# Patient Record
Sex: FEMALE | Race: WHITE | HISPANIC OR LATINO | Employment: FULL TIME | URBAN - METROPOLITAN AREA
[De-identification: names, ages, dates, MRNs, and addresses within clinical notes are randomized per-mention and may not be internally consistent; named-entity substitution may affect disease eponyms.]

---

## 2018-07-06 ENCOUNTER — HOSPITAL ENCOUNTER (EMERGENCY)
Facility: HOSPITAL | Age: 23
Discharge: HOME | End: 2018-07-06
Attending: EMERGENCY MEDICINE | Admitting: EMERGENCY MEDICINE

## 2018-07-06 VITALS
RESPIRATION RATE: 16 BRPM | TEMPERATURE: 98.4 F | HEART RATE: 78 BPM | OXYGEN SATURATION: 100 % | DIASTOLIC BLOOD PRESSURE: 79 MMHG | SYSTOLIC BLOOD PRESSURE: 114 MMHG | WEIGHT: 149.03 LBS

## 2018-07-06 DIAGNOSIS — S01.512A INTRAORAL LACERATION, INITIAL ENCOUNTER: ICD-10-CM
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
## 2018-07-06 PROCEDURE — 99283 EMERGENCY DEPT VISIT LOW MDM: CPT
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
## 2019-06-10 ENCOUNTER — OFFICE VISIT (OUTPATIENT)
Dept: OBGYN CLINIC | Facility: CLINIC | Age: 24
End: 2019-06-10

## 2019-06-10 VITALS
HEART RATE: 80 BPM | DIASTOLIC BLOOD PRESSURE: 77 MMHG | HEIGHT: 62 IN | BODY MASS INDEX: 28.71 KG/M2 | WEIGHT: 156 LBS | SYSTOLIC BLOOD PRESSURE: 112 MMHG

## 2019-06-10 DIAGNOSIS — Z11.3 ROUTINE SCREENING FOR STI (SEXUALLY TRANSMITTED INFECTION): ICD-10-CM

## 2019-06-10 DIAGNOSIS — R10.2 PELVIC PAIN: ICD-10-CM

## 2019-06-10 DIAGNOSIS — R39.15 URINARY URGENCY: ICD-10-CM

## 2019-06-10 DIAGNOSIS — Z01.419 ENCOUNTER FOR ANNUAL ROUTINE GYNECOLOGICAL EXAMINATION: Primary | ICD-10-CM

## 2019-06-10 LAB
SL AMB  POCT GLUCOSE, UA: NEGATIVE
SL AMB LEUKOCYTE ESTERASE,UA: ABNORMAL
SL AMB POCT BILIRUBIN,UA: NEGATIVE
SL AMB POCT BLOOD,UA: ABNORMAL
SL AMB POCT CLARITY,UA: CLEAR
SL AMB POCT COLOR,UA: YELLOW
SL AMB POCT KETONES,UA: NEGATIVE
SL AMB POCT NITRITE,UA: NEGATIVE
SL AMB POCT PH,UA: 6
SL AMB POCT SPECIFIC GRAVITY,UA: 1.02
SL AMB POCT URINE PROTEIN: NEGATIVE
SL AMB POCT UROBILINOGEN: NEGATIVE

## 2019-06-10 PROCEDURE — 87086 URINE CULTURE/COLONY COUNT: CPT | Performed by: NURSE PRACTITIONER

## 2019-06-10 PROCEDURE — 87591 N.GONORRHOEAE DNA AMP PROB: CPT | Performed by: NURSE PRACTITIONER

## 2019-06-10 PROCEDURE — G0124 SCREEN C/V THIN LAYER BY MD: HCPCS | Performed by: PATHOLOGY

## 2019-06-10 PROCEDURE — G0145 SCR C/V CYTO,THINLAYER,RESCR: HCPCS | Performed by: PATHOLOGY

## 2019-06-10 PROCEDURE — 81002 URINALYSIS NONAUTO W/O SCOPE: CPT | Performed by: NURSE PRACTITIONER

## 2019-06-10 PROCEDURE — 87491 CHLMYD TRACH DNA AMP PROBE: CPT | Performed by: NURSE PRACTITIONER

## 2019-06-10 PROCEDURE — 99385 PREV VISIT NEW AGE 18-39: CPT | Performed by: NURSE PRACTITIONER

## 2019-06-10 RX ORDER — NITROFURANTOIN 25; 75 MG/1; MG/1
100 CAPSULE ORAL 2 TIMES DAILY
Qty: 14 CAPSULE | Refills: 0 | Status: SHIPPED | OUTPATIENT
Start: 2019-06-10 | End: 2019-06-17

## 2019-06-11 LAB — BACTERIA UR CULT: NORMAL

## 2019-06-12 LAB
C TRACH DNA SPEC QL NAA+PROBE: NEGATIVE
N GONORRHOEA DNA SPEC QL NAA+PROBE: NEGATIVE

## 2019-06-19 LAB
LAB AP GYN PRIMARY INTERPRETATION: ABNORMAL
Lab: ABNORMAL
PATH INTERP SPEC-IMP: ABNORMAL

## 2019-06-20 ENCOUNTER — TELEPHONE (OUTPATIENT)
Dept: OBGYN CLINIC | Facility: CLINIC | Age: 24
End: 2019-06-20

## 2019-07-11 ENCOUNTER — PROCEDURE VISIT (OUTPATIENT)
Dept: OBGYN CLINIC | Facility: CLINIC | Age: 24
End: 2019-07-11

## 2019-07-11 VITALS
DIASTOLIC BLOOD PRESSURE: 80 MMHG | WEIGHT: 155 LBS | BODY MASS INDEX: 28.35 KG/M2 | HEART RATE: 65 BPM | SYSTOLIC BLOOD PRESSURE: 114 MMHG

## 2019-07-11 DIAGNOSIS — Z01.818 PREPROCEDURAL EXAMINATION: ICD-10-CM

## 2019-07-11 DIAGNOSIS — R87.612 LGSIL OF CERVIX OF UNDETERMINED SIGNIFICANCE: Primary | ICD-10-CM

## 2019-07-11 LAB — SL AMB POCT URINE HCG: NEGATIVE

## 2019-07-11 PROCEDURE — 57455 BIOPSY OF CERVIX W/SCOPE: CPT | Performed by: OBSTETRICS & GYNECOLOGY

## 2019-07-11 PROCEDURE — 88344 IMHCHEM/IMCYTCHM EA MLT ANTB: CPT | Performed by: PATHOLOGY

## 2019-07-11 PROCEDURE — 81025 URINE PREGNANCY TEST: CPT | Performed by: OBSTETRICS & GYNECOLOGY

## 2019-07-11 PROCEDURE — 88305 TISSUE EXAM BY PATHOLOGIST: CPT | Performed by: PATHOLOGY

## 2019-07-11 NOTE — PATIENT INSTRUCTIONS
Colposcopy   WHAT YOU NEED TO KNOW:   A colposcopy is a procedure to look for abnormal cells in your cervix and vagina  Your healthcare provider will use a colposcope, which is a small scope with a light on it  DISCHARGE INSTRUCTIONS:   Medicines:   · Pain medicine: You may be given medicine to take away or decrease pain  Do not wait until the pain is severe before you take your medicine  · Take your medicine as directed  Call your healthcare provider if you think your medicine is not helping or if you have side effects  Tell him if you are allergic to any medicine  Keep a list of the medicines, vitamins, and herbs you take  Include the amounts, and when and why you take them  Bring the list or the pill bottles to follow-up visits  Carry your medicine list with you in case of an emergency  Follow up with your healthcare provider or gynecologist as directed: You may need to return for more tests or to have abnormal cells removed  Write down your questions so you remember to ask them during your visits  Self-care:  Use a sanitary pad for any light bleeding  Ask when it is okay to use tampons, douche, or have sex  If you are pregnant, do not put anything in your vagina until your healthcare provider says it is okay  Avoid heavy lifting for 24 hours after your procedure, this will decrease your risk of bleeding  Contact your healthcare provider or gynecologist if:   · You have pain that does not go away, even after you take pain medicine  · You have a fever  · You have questions or concerns about your condition or care  Seek care immediately or call 911 if:   · You have bleeding from your vagina that is heavier than your monthly period  © 2016 [address] is for End User's use only and may not be sold, redistributed or otherwise used for commercial purposes   All illustrations and images included in CareNotes® are the copyrighted property of A D A M , Inc  or [employer]  The above information is an  only  It is not intended as medical advice for individual conditions or treatments  Talk to your doctor, nurse or pharmacist before following any medical regimen to see if it is safe and effective for you

## 2019-07-11 NOTE — PROGRESS NOTES
Colposcopy  Date/Time: 7/11/2019 9:45 AM  Performed by: Charley Mares MD  Authorized by: Charley Mares MD     Consent:     Consent obtained:  Verbal and written    Consent given by:  Patient    Procedural risks discussed:  Bleeding and infection    Patient questions answered: yes      Patient agrees, verbalizes understanding, and wants to proceed: yes      Educational handouts given: yes      Instructions and paperwork completed: yes    Pre-procedure:     Pre-procedure timeout performed: yes    Indication:     Indication:  LSIL  Procedure:     Procedure: Colposcopy w/ biopsy of cervix      Under satisfactory analgesia the patient was prepped and draped in the dorsal lithotomy position: yes      [place] speculum was placed in the vagina: yes      Under colposcopic examination the transition zone was seen in entirety: yes      Cervical biopsy performed with a cervical biopsy punch: yes      Monsel's solution was applied: yes      Biopsy(s): yes      Location:  12 o'clock    Specimen to pathology: yes    Post-procedure:     Findings: White epithelium      Impression: Low grade cervical dysplasia      Patient tolerance of procedure:   Tolerated well, no immediate complications

## 2019-07-22 PROBLEM — R87.612 PAP SMEAR ABNORMALITY OF CERVIX WITH LGSIL: Status: ACTIVE | Noted: 2019-07-22

## 2019-07-22 NOTE — PROGRESS NOTES
Assessment/Plan:      Diagnoses and all orders for this visit:    Pap smear abnormality of cervix with LGSIL  -Pap LGSIL  - Colposcopy 12oclock biopsy: cervicitis; mild squamous atypia, favor LSIL; no high grade dysplasia  Recommend co-testing at 12 months  Follow up in 1 year for annual with co-testing as mentioned above  Patient declines contraception,         Subjective:     Patient ID: Chely Mercedes is a 21 y o  female  HPI     Chely Mercedes presents for colposcopy results  Pap on 6/10/2019: LGSIL  Status post colposcopy on 7/11/2019 impression: low grade, biopsy taken at 12o'clock  Biopsy pathology: "marked cervicitis with mild squamous atypia, favor LSIL  No high grade dysplasia or malignancy identified "    Results were shown to and reviewed with the patient in depth  We reviewed ASCCP guidelines, recommend repeat pap and co-testing in 12 months  All questions were answered to her satisfaction  Review of Systems   Genitourinary: Negative for pelvic pain, vaginal bleeding, vaginal discharge and vaginal pain  Objective:     Physical Exam   Constitutional: She appears well-developed and well-nourished  No distress  Pulmonary/Chest: No respiratory distress  Psychiatric: She has a normal mood and affect   Her behavior is normal

## 2019-07-22 NOTE — PATIENT INSTRUCTIONS
Cervical Intraepithelial Neoplasia   WHAT YOU NEED TO KNOW:   What is cervical intraepithelial neoplasia? Cervical intraepithelial neoplasia occurs when there are changes in the cells on the surface of your cervix  It is also called cervical dysplasia, or UNIQUE  The cervix is where the lower part of the uterus meets the vagina  UNIQUE may develop into cancer if it is not found and treated  What causes UNIQUE?  UNIQUE is most often caused by a human papillomavirus (HPV) infection  HPV is a sexually transmitted infection (STI)  The following may increase your risk for UNIQUE:  · Cigarette smoking    · Exposure to certain medicines, such as diethylstilbestrol (MARIA ELENA)    · Having a child before age 12     · Having sex for the first time before age 25    · Multiple sexual partners  How is UNIQUE diagnosed? UNIQUE is usually found after one or more of the following tests:  · A Papanicolaou (Pap) test  is done during a pelvic exam to check for abnormal cells in the cervix  Cells are collected and sent to a lab for tests  The sample may also be checked for HPV  · A colposcopy  is a procedure where your healthcare provider uses a small scope with a light to look more closely at your cervix and vagina  · A biopsy  is when a small sample of tissue is removed from your cervix  It may be taken during a colposcopy  The sample is sent to a lab and tested for abnormal cells  How do healthcare providers classify UNIQUE? Healthcare providers classify UNIQUE based upon how thick and how deep abnormal cells are found on your cervix  · UNIQUE I  is also called mild UNIQUE  This occurs when there are only a few abnormal cells found on the surface of your cervix  · UNIQUE II  is also called moderate UNIQUE  This occurs when ? of the lining of your cervix has abnormal cells  · UNIQUE III  is also called severe UNIQUE or carcinoma-in-situ  This means that the entire lining of your cervix has abnormal cells  This often progresses to become cervical cancer    How is UNIQUE treated? No treatment is usually needed for mild UNIQUE  Your healthcare provider may want you to have more frequent Pap tests  You may also need to have more frequent colposcopies to see if the cells have changed  If you have moderate or severe UNIQUE, you may need surgery to burn, freeze, or remove the abnormal cells  How can I manage my condition? · Have regular Pap tests  Ask your healthcare provider how often you should have a Pap test      · Do not smoke  If you smoke, it is never too late to quit  Smoking increases the risk of UNIQUE  Ask your healthcare provider for information if you need help quitting  How can I prevent another HPV infection? · Ask about a vaccination  to reduce the risk of HPV infection  If you are younger than 32years old, you may be able to receive a vaccine to prevent an HPV infection  · Use a condom  when you have sex  When should I contact my healthcare provider? · You have a fever  · You have chills, a cough, or feel weak and achy  · You have heavy vaginal bleeding (soaking 1 pad every hour)  · You have yellow or foul smelling discharge from your vagina  · You have severe abdominal pain or vomiting  · You have questions or concerns about your condition or care  When should I seek immediate care or call 911? · You have sudden shortness of breath  CARE AGREEMENT:   You have the right to help plan your care  Learn about your health condition and how it may be treated  Discuss treatment options with your caregivers to decide what care you want to receive  You always have the right to refuse treatment  The above information is an  only  It is not intended as medical advice for individual conditions or treatments  Talk to your doctor, nurse or pharmacist before following any medical regimen to see if it is safe and effective for you    © 2017 [employer] Pena Information is for End User's use only and may not be sold, redistributed or otherwise used for commercial purposes  All illustrations and images included in CareNotes® are the copyrighted property of A D A M , Inc  or Richardson Solo

## 2019-07-22 NOTE — ASSESSMENT & PLAN NOTE
Pap LGSIL  Colposcopy 12oclock biopsy: cervicitis; mild squamous atypia, favor LSIL; no high grade dysplasia  Recommend co-testing at 12 months

## 2019-07-25 ENCOUNTER — OFFICE VISIT (OUTPATIENT)
Dept: OBGYN CLINIC | Facility: CLINIC | Age: 24
End: 2019-07-25

## 2019-07-25 VITALS
HEART RATE: 74 BPM | WEIGHT: 156.4 LBS | HEIGHT: 62 IN | SYSTOLIC BLOOD PRESSURE: 118 MMHG | BODY MASS INDEX: 28.78 KG/M2 | DIASTOLIC BLOOD PRESSURE: 71 MMHG

## 2019-07-25 DIAGNOSIS — R87.612 PAP SMEAR ABNORMALITY OF CERVIX WITH LGSIL: Primary | ICD-10-CM

## 2019-07-25 PROCEDURE — 99213 OFFICE O/P EST LOW 20 MIN: CPT | Performed by: OBSTETRICS & GYNECOLOGY

## 2019-07-25 NOTE — LETTER
July 25, 2019     Patient: Radha Braden   YOB: 1995   Date of Visit: 7/25/2019       To Whom it May Concern:    Radha Braden is under my professional care  She was seen in my office on 7/25/2019  She may return to work on 7/25/19  If you have any questions or concerns, please don't hesitate to call           Sincerely,          Juliann Melendez MD        CC: No Recipients

## 2020-07-27 NOTE — PROGRESS NOTES
ASSESSMENT & PLAN: Didier Leal is a 31 y o  Janyce Israel with normal gynecologic exam     1   Routine well woman exam done today  2  Pap:  The patient's last pap was  06/10/2019  It was abnormal    It was LGSIL  She had a colposcopy done 07/11/2019- showed marked cervicitis with mild squamous atypia, favor  LSIL, no high-grade dysplasia  Pap was done today  Results will be discussed with patient  Current ASCCP Guidelines reviewed  Decision of colposcopy vs re-pap will be based on results  3   STD testing  was done today  Patient had negative testing 1 year ago  She has consented to receive serum testing for HIV, Hepatitis Bs ag, and RPR  4   Gardasil recommendations reviewed  is vaccinated  5  The following were reviewed in today's visit: STD testing, cervical cancer prevention, safe sexual practices  6  Pelvic pain: intermittent over the past 8 years with mild postcoital bleeding  Episodes almost every day in the past week  Patient advised to take ibuprofen during episodes  Will follow up with transabdominal and transvaginal ultrasound  Patient advised to keep pain diary, will call result to pt    7  Nipple discharge  Intermittent with manual stimulation over the past 8 years  Patient has history of migraine headaches  Fasting serum prolactin level ordered  CC:  Annual Gynecologic Examination    HPI: Didier Leal is a 31 y o  Janyce Israel who presents for annual gynecologic examination  She has the following concerns:  Intermittent pelvic pain, postcoital bleeding, and left nipple discharge  She states that she has been experiencing pelvic pain for the last 8 years which has become more consistent in the past few months  The episodes start insidiously and are described as cramping/pressure  The episodes can last over an hours, the most recent episode two days ago lasted all day   In the past few months patient notes increased frequency with episodes with occurrence almost every day in the past week  The patient states that she receives mild relief from lying on her stomach with a hot pad and ibuprofen, although she does not consistently take OTCs during episodes as she does not want to overuse medication  The pain is possibly associated with bleeding as the patient notes blood on tissue paper when wiping vulvar area  Pain is not palliated or agrivated by defecation and patient reports no history of hemorrhoids  She denies urinary urgency, frequency, dysuria, vaginal itching, or discharge  Patient complains of intermittent left nipple discharge x 8 years  She states that the discharge is clear and colorless to opaque and white without blood  She states that she will at times feel a cool sensation as if her breast is wet but notices no staining of her bra, she is then able to manually bring out discharge  She denies any change in the size or density of her left breast and denies masses or skin changes  Health Maintenance:    She wears her seatbelt routinely  She does not perform regular monthly self breast exams  She feels safe at home  Past Medical History:   Diagnosis Date    Scoliosis        No past surgical history on file  OB/Gyn History:    Pt does not have menstrual issues  History of sexually transmitted infection: No   History of abnormal pap smears: Yes  Last pap on 06/10/2019 showed abnormal results  Follow up culposcopy on 2019- showed marked cervicitis with mild squamous atypia, favor LSIL, no high-grade dysplasia  Patient is currently sexually active with one female partner  The current method of family planning is none      OB History        1    Para   1    Term   1            AB        Living   1       SAB        TAB        Ectopic        Multiple        Live Births   1                     Family History   Problem Relation Age of Onset    No Known Problems Mother     No Known Problems Father        Social History:  Social History Socioeconomic History    Marital status: Single     Spouse name: Not on file    Number of children: Not on file    Years of education: Not on file    Highest education level: Not on file   Occupational History    Not on file   Social Needs    Financial resource strain: Not on file    Food insecurity:     Worry: Not on file     Inability: Not on file    Transportation needs:     Medical: Not on file     Non-medical: Not on file   Tobacco Use    Smoking status: Never Smoker    Smokeless tobacco: Never Used   Substance and Sexual Activity    Alcohol use: No    Drug use: No    Sexual activity: Yes     Partners: Female     Birth control/protection: None   Lifestyle    Physical activity:     Days per week: Not on file     Minutes per session: Not on file    Stress: Not on file   Relationships    Social connections:     Talks on phone: Not on file     Gets together: Not on file     Attends Protestant service: Not on file     Active member of club or organization: Not on file     Attends meetings of clubs or organizations: Not on file     Relationship status: Not on file    Intimate partner violence:     Fear of current or ex partner: Not on file     Emotionally abused: Not on file     Physically abused: Not on file     Forced sexual activity: Not on file   Other Topics Concern    Not on file   Social History Narrative    Not on file     Patient is same sex partner  Patient is currently employed  Lives at home with son  No Known Allergies    No current outpatient medications on file  Review of Systems:  Constitutional :no fever, feels well, no tiredness, no recent weight gain or loss  ENT: no ear ache, no loss of hearing, no nosebleeds or nasal discharge, no sore throat or hoarseness  Cardiovascular: no complaints of slow or fast heart beat, no chest pain, no palpitations, no leg claudication or lower extremity edema    Respiratory: no complaints of shortness of shortness of breath, no TORRES  Breasts:no complaints of breast pain, breast lump  Nipple discharge as noted in HPI  Gastrointestinal: no complaints of abdominal pain, constipation, nausea, vomiting, or diarrhea or bloody stools  Genitourinary : no complaints of dysuria, incontinence, pelvic pain, no dysmenorrhea, complains as vaginal bleeding as noted in HPI  Musculoskeletal: no complaints of arthralgia, no myalgia, no joint swelling or stiffness, no limb pain or swelling  Integumentary: no complaints of skin rash or lesion, itching or dry skin  Neurological: complains of intermittent migraine headache, no confusion, no numbness or tingling, no dizziness or fainting    Objective      There were no vitals taken for this visit  General:   appears stated age, cooperative, alert normal mood and affect   Neck: normal, supple,trachea midline, no masses   Heart: regular rate and rhythm, S1, S2 normal, no murmur, click, rub or gallop   Lungs: clear to auscultation bilaterally   Breasts: normal appearance, no masses or tenderness, No nipple retraction or dimpling, No nipple discharge or bleeding, No axillary or supraclavicular adenopathy   Abdomen: soft, non-tender, without masses or organomegaly   Vulva: normal, Bartholin's, Urethra, Wilkinson normal   Vagina: normal vagina, no discharge, exudate, lesion, or erythema   Urethra: normal   Cervix: Normal, no discharge  PAP done  GCC done  Nontender  Uterus: normal size, contour, position, consistency, mobility, non-tender and anteverted   Adnexa: normal adnexa and no mass, fullness, tenderness   Lymphatic palpation of lymph nodes in neck, axilla, groin and/or other locations: no lymphadenopathy or masses noted   Skin normal skin turgor and no rashes     Psychiatric orientation to person, place, and time: normal  mood and affect: normal     Pt was seen by Earl VILLAFANA and Maisha PARK

## 2020-07-28 ENCOUNTER — ANNUAL EXAM (OUTPATIENT)
Dept: OBGYN CLINIC | Facility: CLINIC | Age: 25
End: 2020-07-28

## 2020-07-28 VITALS
HEART RATE: 85 BPM | DIASTOLIC BLOOD PRESSURE: 78 MMHG | TEMPERATURE: 98.2 F | HEIGHT: 63 IN | WEIGHT: 167 LBS | BODY MASS INDEX: 29.59 KG/M2 | SYSTOLIC BLOOD PRESSURE: 108 MMHG

## 2020-07-28 DIAGNOSIS — N64.52 NIPPLE DISCHARGE: ICD-10-CM

## 2020-07-28 DIAGNOSIS — Z20.2 POSSIBLE EXPOSURE TO STD: ICD-10-CM

## 2020-07-28 DIAGNOSIS — R10.2 PELVIC PAIN: ICD-10-CM

## 2020-07-28 DIAGNOSIS — Z01.419 ENCOUNTER FOR ANNUAL ROUTINE GYNECOLOGICAL EXAMINATION: Primary | ICD-10-CM

## 2020-07-28 DIAGNOSIS — R87.612 LGSIL OF CERVIX OF UNDETERMINED SIGNIFICANCE: ICD-10-CM

## 2020-07-28 PROCEDURE — 87491 CHLMYD TRACH DNA AMP PROBE: CPT | Performed by: NURSE PRACTITIONER

## 2020-07-28 PROCEDURE — 99395 PREV VISIT EST AGE 18-39: CPT | Performed by: NURSE PRACTITIONER

## 2020-07-28 PROCEDURE — G0124 SCREEN C/V THIN LAYER BY MD: HCPCS | Performed by: PATHOLOGY

## 2020-07-28 PROCEDURE — G0145 SCR C/V CYTO,THINLAYER,RESCR: HCPCS | Performed by: PATHOLOGY

## 2020-07-28 PROCEDURE — 87591 N.GONORRHOEAE DNA AMP PROB: CPT | Performed by: NURSE PRACTITIONER

## 2020-07-28 PROCEDURE — 87624 HPV HI-RISK TYP POOLED RSLT: CPT | Performed by: NURSE PRACTITIONER

## 2020-07-28 NOTE — PATIENT INSTRUCTIONS
Covid - 19 instructions    If you are having any of the following     Cough   Shortness of breath   Fever  If traveled internationally or to high risk [place] states  Or been in contact with someone that has     Please call:    [phone]  option 7    They will screen you and direct you to the nearest testing location     Should not come to the PCP or OB office without calling that number first    Will call results

## 2020-07-30 LAB
HPV HR 12 DNA CVX QL NAA+PROBE: POSITIVE
HPV16 DNA CVX QL NAA+PROBE: NEGATIVE
HPV18 DNA CVX QL NAA+PROBE: NEGATIVE

## 2020-07-31 DIAGNOSIS — N64.52 NIPPLE DISCHARGE: Primary | ICD-10-CM

## 2020-07-31 LAB
C TRACH DNA SPEC QL NAA+PROBE: NEGATIVE
N GONORRHOEA DNA SPEC QL NAA+PROBE: NEGATIVE

## 2020-08-03 ENCOUNTER — TELEPHONE (OUTPATIENT)
Dept: OBGYN CLINIC | Facility: CLINIC | Age: 25
End: 2020-08-03

## 2020-08-05 LAB
LAB AP GYN PRIMARY INTERPRETATION: ABNORMAL
Lab: ABNORMAL
PATH INTERP SPEC-IMP: ABNORMAL

## 2020-08-06 ENCOUNTER — TELEPHONE (OUTPATIENT)
Dept: OBGYN CLINIC | Facility: CLINIC | Age: 25
End: 2020-08-06

## 2020-08-06 NOTE — TELEPHONE ENCOUNTER
----- Message from Joby Mai, [address] Ben[address] Pena sent at 8/5/2020  9:41 PM EDT -----  Please call pt to inform that she needs a colpo    Thanks

## 2020-08-06 NOTE — TELEPHONE ENCOUNTER
Called patient to review pap results and schedule colposcopy  Message left for patient to call office

## 2020-08-10 ENCOUNTER — TELEPHONE (OUTPATIENT)
Dept: OBGYN CLINIC | Facility: CLINIC | Age: 25
End: 2020-08-10

## 2020-08-10 NOTE — TELEPHONE ENCOUNTER
----- Message from Fadumo Bellamy, [address] Ben[address] Pena sent at 8/5/2020  9:41 PM EDT -----  Please call pt to inform that she needs a colpo    Thanks

## 2020-08-10 NOTE — TELEPHONE ENCOUNTER
Patient notified of need for colposcopy  Procedure explained to patient    Patient scheduled for colposcopy on 8/27

## 2020-08-27 ENCOUNTER — PROCEDURE VISIT (OUTPATIENT)
Dept: OBGYN CLINIC | Facility: CLINIC | Age: 25
End: 2020-08-27

## 2020-08-27 VITALS
TEMPERATURE: 98 F | SYSTOLIC BLOOD PRESSURE: 124 MMHG | WEIGHT: 167.8 LBS | BODY MASS INDEX: 30.2 KG/M2 | DIASTOLIC BLOOD PRESSURE: 84 MMHG | HEART RATE: 82 BPM

## 2020-08-27 DIAGNOSIS — R87.618 PAP SMEAR ABNORMALITY OF CERVIX/HUMAN PAPILLOMAVIRUS (HPV) POSITIVE: ICD-10-CM

## 2020-08-27 DIAGNOSIS — R87.610 ATYPICAL SQUAMOUS CELLS OF UNDETERMINED SIGNIFICANCE (ASCUS) ON PAPANICOLAOU SMEAR OF CERVIX: Primary | ICD-10-CM

## 2020-08-27 LAB — SL AMB POCT URINE HCG: NEGATIVE

## 2020-08-27 PROCEDURE — 88305 TISSUE EXAM BY PATHOLOGIST: CPT | Performed by: PATHOLOGY

## 2020-08-27 PROCEDURE — 57454 BX/CURETT OF CERVIX W/SCOPE: CPT | Performed by: OBSTETRICS & GYNECOLOGY

## 2020-08-27 PROCEDURE — 81025 URINE PREGNANCY TEST: CPT | Performed by: OBSTETRICS & GYNECOLOGY

## 2020-08-27 NOTE — PATIENT INSTRUCTIONS
Colposcopy   WHAT YOU NEED TO KNOW:   A colposcopy is a procedure to look for abnormal cells in your cervix and vagina  Your healthcare provider will use a colposcope, which is a small scope with a light on it  DISCHARGE INSTRUCTIONS:   Medicines:   · Pain medicine: You may be given medicine to take away or decrease pain  Do not wait until the pain is severe before you take your medicine  · Take your medicine as directed  Call your healthcare provider if you think your medicine is not helping or if you have side effects  Tell him if you are allergic to any medicine  Keep a list of the medicines, vitamins, and herbs you take  Include the amounts, and when and why you take them  Bring the list or the pill bottles to follow-up visits  Carry your medicine list with you in case of an emergency  Follow up with your healthcare provider or gynecologist as directed: You may need to return for more tests or to have abnormal cells removed  Write down your questions so you remember to ask them during your visits  Self-care:  Use a sanitary pad for any light bleeding  Ask when it is okay to use tampons, douche, or have sex  If you are pregnant, do not put anything in your vagina until your healthcare provider says it is okay  Avoid heavy lifting for 24 hours after your procedure, this will decrease your risk of bleeding  Contact your healthcare provider or gynecologist if:   · You have pain that does not go away, even after you take pain medicine  · You have a fever  · You have questions or concerns about your condition or care  Seek care immediately or call 911 if:   · You have bleeding from your vagina that is heavier than your monthly period  © 2016 [employer] is for End User's use only and may not be sold, redistributed or otherwise used for commercial purposes   All illustrations and images included in CareNotes® are the copyrighted property of [employer]  or [employer]  The above information is an  only  It is not intended as medical advice for individual conditions or treatments  Talk to your doctor, nurse or pharmacist before following any medical regimen to see if it is safe and effective for you

## 2020-08-27 NOTE — PROGRESS NOTES
OB/GYN VISIT  Julien Palm  8/27/2020  11:31 AM    Subjective:     Julien Palm is a 31 y o  P3 female here for colposcopy  Pap history:   6/10/2019: LSIL  7/11/2019: colpo 12:oclock cervicitis, favor LSIL  7/28/2020: ASCUS, HPV other HR +    She is here for colposcopy today  Discussed procedure - consent form signed  Objective:    Vitals: Blood pressure 124/84, pulse 82, temperature 98 °F (36 7 °C), weight 76 1 kg (167 lb 12 8 oz), last menstrual period 08/06/2020  Body mass index is 30 2 kg/m²  Colposcopy    Date/Time: 8/27/2020 11:32 AM  Performed by: Delvis Cordova MD  Authorized by: Delvis Cordova MD     Consent:     Consent obtained:  Verbal and written    Consent given by:  Patient    Procedural risks discussed:  Bleeding, infection, repeat procedure and possible continued pain    Patient questions answered: yes      Patient agrees, verbalizes understanding, and wants to proceed: yes      Instructions and paperwork completed: yes    Pre-procedure:     Pre-procedure timeout performed: yes      Prepped with: acetic acid and Lugol    Indication:     Indication:  ASC-US  Procedure:     Procedure: Colposcopy w/ cervical biopsy and ECC      [place] speculum was placed in the vagina: yes      Under colposcopic examination the transition zone was seen in entirety: yes      Endocervix was curetted using a Kevorkian curette: yes      Cervical biopsy performed with a cervical biopsy punch: yes      Monsel's solution was applied: yes      Biopsy(s): yes      Location:  6 o'clock    Specimen to pathology: yes    Post-procedure:     Impression: Low grade cervical dysplasia      Patient tolerance of procedure: Tolerated well, no immediate complications  Comments:      Erythematous, decreased Lugol's uptake at 6 o'clock position  No polyps, white epithelium, condyloma, mosaicism, punctations noted  Assessment/Plan:    31year old female here for colposcopy      Impression: LSIL  Will review results when available  She has a results visit scheduled on 9/10  If no further treatment is needed, will call patient with results instead  Colposcopy completed with Dr Karli Salmon in room          Pastor Raheel MD  8/27/2020  11:31 AM

## 2020-09-11 ENCOUNTER — TELEPHONE (OUTPATIENT)
Dept: OBGYN CLINIC | Facility: CLINIC | Age: 25
End: 2020-09-11

## 2020-09-18 ENCOUNTER — TELEPHONE (OUTPATIENT)
Dept: OBGYN CLINIC | Facility: CLINIC | Age: 25
End: 2020-09-18

## 2021-08-12 ENCOUNTER — PATIENT OUTREACH (OUTPATIENT)
Dept: OBGYN CLINIC | Facility: CLINIC | Age: 26
End: 2021-08-12

## 2021-08-12 ENCOUNTER — ANNUAL EXAM (OUTPATIENT)
Dept: OBGYN CLINIC | Facility: CLINIC | Age: 26
End: 2021-08-12

## 2021-08-12 VITALS
WEIGHT: 178 LBS | HEART RATE: 84 BPM | SYSTOLIC BLOOD PRESSURE: 124 MMHG | BODY MASS INDEX: 32.76 KG/M2 | DIASTOLIC BLOOD PRESSURE: 76 MMHG | HEIGHT: 62 IN

## 2021-08-12 DIAGNOSIS — R87.612 LGSIL OF CERVIX OF UNDETERMINED SIGNIFICANCE: Primary | ICD-10-CM

## 2021-08-12 DIAGNOSIS — Z13.31 POSITIVE DEPRESSION SCREENING: ICD-10-CM

## 2021-08-12 DIAGNOSIS — Z01.419 WELL WOMAN EXAM WITH ROUTINE GYNECOLOGICAL EXAM: ICD-10-CM

## 2021-08-12 DIAGNOSIS — Z72.52 HIGH RISK HOMOSEXUAL BEHAVIOR: ICD-10-CM

## 2021-08-12 DIAGNOSIS — R87.618 PAP SMEAR ABNORMALITY OF CERVIX/HUMAN PAPILLOMAVIRUS (HPV) POSITIVE: ICD-10-CM

## 2021-08-12 DIAGNOSIS — R87.610 ATYPICAL SQUAMOUS CELLS OF UNDETERMINED SIGNIFICANCE (ASCUS) ON PAPANICOLAOU SMEAR OF CERVIX: ICD-10-CM

## 2021-08-12 PROCEDURE — 87591 N.GONORRHOEAE DNA AMP PROB: CPT | Performed by: STUDENT IN AN ORGANIZED HEALTH CARE EDUCATION/TRAINING PROGRAM

## 2021-08-12 PROCEDURE — 87491 CHLMYD TRACH DNA AMP PROBE: CPT | Performed by: STUDENT IN AN ORGANIZED HEALTH CARE EDUCATION/TRAINING PROGRAM

## 2021-08-12 PROCEDURE — G0145 SCR C/V CYTO,THINLAYER,RESCR: HCPCS | Performed by: STUDENT IN AN ORGANIZED HEALTH CARE EDUCATION/TRAINING PROGRAM

## 2021-08-12 PROCEDURE — 99213 OFFICE O/P EST LOW 20 MIN: CPT | Performed by: OBSTETRICS & GYNECOLOGY

## 2021-08-12 NOTE — PROGRESS NOTES
Subjective     Melanie Cochran is a 31 y o  female who presents for annual well woman exam  Periods are regular every 28-30 days, lasting 7 days  LMP 21  GYN:  · NO vaginal discharge, labial erythema, dyspareunia  · Endorses lesions both in her stretch marks and her labia  · Menses are regular, q 28 days, lasting 7 days  · Contraception: none, in same sex relationship  · Patient is  sexually active with 1 female partner partner  · Gynecologic surgery: none    OB:  ·  female  Pregnancies were uncomplicated  :  · Denies dysuria, urinary frequency or urgency  · Denies hematuria, flank pain, incontinence  Breast:  · No breast mass, skin changes, dimpling, reddening, nipple retraction  · No breast discharge  · Last mammogram was in N/A  Results were N/A  · Patient does have a family history of breast, endometrial, or ovarian ca  Maternal grandmother had breast biopsies but no known h/o cancer     General:  · Diet: lots of take out and snacks, trying to change dietary  · Exercise: none  · Work: work for [employer]  · ETOH use: socailly, holidays  · Tobacco use: non3  · Recreational drug use: none    Screening:  · Cervical cancer: last pap smear in   Results were ASCUS, HPV other pos  · Breast cancer: last mammogram in N/A  Results were N/A   · Colon cancer: last colonoscopy in N/A  Results were N/A   · STD screening: today  Review of Systems  Pertinent items are noted in HPI  Objective      /76 (BP Location: Left arm, Patient Position: Sitting, Cuff Size: Adult)   Pulse 84   Ht 5' 2" (1 575 m)   Wt 80 7 kg (178 lb)   LMP 2021   BMI 32 56 kg/m²     Physical Exam  Constitutional:       General: She is not in acute distress  Appearance: She is well-developed  She is not diaphoretic  HENT:      Head: Normocephalic and atraumatic  Eyes:      Pupils: Pupils are equal, round, and reactive to light  Neck:      Trachea: No tracheal deviation     Cardiovascular: Rate and Rhythm: Normal rate and regular rhythm  Heart sounds: Normal heart sounds  No murmur heard  No friction rub  No gallop  Pulmonary:      Effort: Pulmonary effort is normal  No respiratory distress  Breath sounds: Normal breath sounds  No stridor  No wheezing or rales  Chest:      Breasts: Breasts are symmetrical          Right: Normal          Left: Normal    Abdominal:      General: Bowel sounds are normal  There is no distension  Palpations: Abdomen is soft  There is no mass  Tenderness: There is no abdominal tenderness  There is no guarding or rebound  Genitourinary:     Labia:         Right: No rash, tenderness, lesion or injury  Left: No rash, tenderness, lesion or injury  Vagina: No signs of injury  No vaginal discharge or bleeding  Cervix: No cervical motion tenderness  Uterus: Normal  Not fixed and not tender  Adnexa: Right adnexa normal and left adnexa normal         Right: No mass, tenderness or fullness  Left: No mass, tenderness or fullness  Musculoskeletal:         General: No tenderness or deformity  Normal range of motion  Cervical back: Normal range of motion  Skin:     General: Skin is warm and dry  Coloration: Skin is not pale  Findings: No erythema or rash  Neurological:      Mental Status: She is alert and oriented to person, place, and time  Coordination: Coordination normal                  Assessment     Michi Hawkins is a 31 y o  [facility] with normal gynecologic exam      Plan     21-29  1  Routine well woman exam done today  2   Pap and HPV:Pap with HPV was done today  Current ASCCP Guidelines reviewed  3   The patient accepted STD testing  chlamydia, gonorrhea, HIV and HPV testing performed  Safe sex practices have been discussed  4  The patient is sexually active  She declined contraception and options have been discussed      5  The following were reviewed in today's visit: STD testing, family planning choices, exercise and healthy diet    6  Patient to return to office in 12 months for annual

## 2021-08-17 LAB
C TRACH DNA SPEC QL NAA+PROBE: NEGATIVE
N GONORRHOEA DNA SPEC QL NAA+PROBE: NEGATIVE

## 2021-08-17 NOTE — PROGRESS NOTES
DAMARI spoke with 30 y/o- S- P1-  Bilingual woman to address depression  Pt resides with her 5 y/o son  Pt denies any usage of drug, alcohol or smoking  Pt denies any prior history mental health  Pt is employed and works from home  Pt repoted her job has become more and more stressful and overwhelming  Pt reported feeling anxious, having difficulties to be around people, thoughts of something bad is going to happened, sad feelings and loss of motivation  Pt denies any SI / HI  Pt claimed her sister and aunt are very supportive  Pt tries to control the anxiety by cleaning the house, squeezing her ball or laying on her bed  Pt verbalized interest on MH evaluating  DAMARI explained her role and offered  Assistance to make appointment  Pt preferred to call her self  JESSEE TUCKER provided phone number for [facility]  Pt will call JESSEE TUCKER with outcomes  No other needs identified at this time

## 2021-08-18 LAB
LAB AP GYN PRIMARY INTERPRETATION: NORMAL
Lab: NORMAL
PATH INTERP SPEC-IMP: NORMAL

## 2021-08-19 ENCOUNTER — PATIENT OUTREACH (OUTPATIENT)
Dept: OBGYN CLINIC | Facility: CLINIC | Age: 26
End: 2021-08-19

## 2021-09-17 ENCOUNTER — APPOINTMENT (EMERGENCY)
Dept: RADIOLOGY | Facility: HOSPITAL | Age: 26
End: 2021-09-17
Payer: COMMERCIAL

## 2021-09-17 ENCOUNTER — HOSPITAL ENCOUNTER (EMERGENCY)
Facility: HOSPITAL | Age: 26
Discharge: HOME | End: 2021-09-17
Attending: INTERNAL MEDICINE
Payer: COMMERCIAL

## 2021-09-17 VITALS
SYSTOLIC BLOOD PRESSURE: 120 MMHG | RESPIRATION RATE: 18 BRPM | BODY MASS INDEX: 32.76 KG/M2 | WEIGHT: 178 LBS | HEART RATE: 107 BPM | DIASTOLIC BLOOD PRESSURE: 68 MMHG | HEIGHT: 62 IN | TEMPERATURE: 98.6 F | OXYGEN SATURATION: 100 %

## 2021-09-17 DIAGNOSIS — T14.8XXA CRUSH INJURY: ICD-10-CM

## 2021-09-17 DIAGNOSIS — S69.90XA FINGER INJURY: Primary | ICD-10-CM

## 2021-09-17 PROCEDURE — 73140 X-RAY EXAM OF FINGER(S): CPT

## 2021-09-17 PROCEDURE — 99284 EMERGENCY DEPT VISIT MOD MDM: CPT | Performed by: PHYSICIAN ASSISTANT

## 2021-09-17 PROCEDURE — 99283 EMERGENCY DEPT VISIT LOW MDM: CPT

## 2021-09-17 RX ORDER — ACETAMINOPHEN 325 MG/1
650 TABLET ORAL ONCE
Status: COMPLETED | OUTPATIENT
Start: 2021-09-17 | End: 2021-09-17

## 2021-09-17 RX ORDER — NAPROXEN 500 MG/1
500 TABLET ORAL 2 TIMES DAILY PRN
Qty: 10 TABLET | Refills: 0 | Status: SHIPPED | OUTPATIENT
Start: 2021-09-17

## 2021-09-17 RX ADMIN — ACETAMINOPHEN 650 MG: 325 TABLET, FILM COATED ORAL at 12:21

## 2021-09-17 NOTE — ED PROVIDER NOTES
History  Chief Complaint   Patient presents with    Finger Injury     pt reports shutting car door on left ring finger approx 1 hour ago  reports 08/10 pain, numbness and tingling, swelling     This is a 38year-old female patient who accidentally closed her left ring finger in a car door  She complains of pain at her middle phalanx 4th digit  Patient is left-hand dominant  It hurts to move  Has taken nothing over-the-counter  States that she has some numbness and paresthesias in her finger however has full sensation cap refill  Range of motion is full with discomfort but able  All rings were removed prior x-ray to avoid entrapment  She will be given Tylenol receive an x-ray and further discussion she offers no other complaints          None       Past Medical History:   Diagnosis Date    Scoliosis        History reviewed  No pertinent surgical history  Family History   Problem Relation Age of Onset    No Known Problems Mother     No Known Problems Father     Breast cancer Maternal Grandmother      I have reviewed and agree with the history as documented  E-Cigarette/Vaping    E-Cigarette Use Never User      E-Cigarette/Vaping Substances     Social History     Tobacco Use    Smoking status: Never Smoker    Smokeless tobacco: Never Used   Vaping Use    Vaping Use: Never used   Substance Use Topics    Alcohol use: Yes     Comment: occas    Drug use: No       Review of Systems   Constitutional: Negative for fatigue and fever  HENT: Negative for congestion and hearing loss  Eyes: Negative for photophobia and pain  Respiratory: Negative for cough and chest tightness  Cardiovascular: Negative for chest pain and leg swelling  Gastrointestinal: Negative for abdominal pain, diarrhea and nausea  Endocrine: Negative for polydipsia and polyphagia  Genitourinary: Negative for dysuria and frequency  Musculoskeletal: Negative for arthralgias and gait problem     Skin: Negative for pallor and rash  Allergic/Immunologic: Negative for environmental allergies and food allergies  Neurological: Negative for dizziness and headaches  Psychiatric/Behavioral: Negative for agitation and confusion  Physical Exam  Physical Exam  Vitals and nursing note reviewed  Constitutional:       General: She is not in acute distress  Appearance: She is not ill-appearing, toxic-appearing or diaphoretic  HENT:      Head: Normocephalic and atraumatic  Right Ear: Tympanic membrane, ear canal and external ear normal       Left Ear: Tympanic membrane, ear canal and external ear normal       Nose: Nose normal  No congestion or rhinorrhea  Mouth/Throat:      Mouth: Mucous membranes are dry  Pharynx: Oropharynx is clear  No oropharyngeal exudate or posterior oropharyngeal erythema  Eyes:      Extraocular Movements: Extraocular movements intact  Conjunctiva/sclera: Conjunctivae normal       Pupils: Pupils are equal, round, and reactive to light  Cardiovascular:      Rate and Rhythm: Normal rate and regular rhythm  Pulmonary:      Effort: Pulmonary effort is normal  No respiratory distress  Breath sounds: Normal breath sounds  Abdominal:      General: Bowel sounds are normal       Palpations: Abdomen is soft  Tenderness: There is no abdominal tenderness  Musculoskeletal:         General: Normal range of motion  Hands:       Cervical back: Normal range of motion and neck supple  No rigidity or tenderness  Right lower leg: No edema  Left lower leg: No edema  Lymphadenopathy:      Cervical: No cervical adenopathy  Skin:     General: Skin is warm and dry  Capillary Refill: Capillary refill takes less than 2 seconds  Findings: No rash  Neurological:      General: No focal deficit present  Mental Status: She is oriented to person, place, and time  Mental status is at baseline     Psychiatric:         Mood and Affect: Mood normal  Behavior: Behavior normal          Vital Signs  ED Triage Vitals [09/17/21 1130]   Temperature Pulse Respirations Blood Pressure SpO2   98 6 °F (37 °C) (!) 107 18 120/68 100 %      Temp Source Heart Rate Source Patient Position - Orthostatic VS BP Location FiO2 (%)   Oral Monitor Sitting Right arm --      Pain Score       8           Vitals:    09/17/21 1130   BP: 120/68   Pulse: (!) 107   Patient Position - Orthostatic VS: Sitting         Visual Acuity      ED Medications  Medications   acetaminophen (TYLENOL) tablet 650 mg (650 mg Oral Given 9/17/21 1221)       Diagnostic Studies  Results Reviewed     None                 XR finger fourth digit-ring LEFT   Final Result by Krysta Reese MD (09/17 1251)      Acute minimally displaced dorsal fourth distal phalangeal intra-articular avulsion fracture            Workstation performed: [id number]                    Procedures  Procedures         ED Course  ED Course as of Sep 17 1824   Fri Sep 17, 2021   4143 Splint application: static finger Splint was applied, Applied by technician, good position, neurovascular tendon intact, good capillary refill  Evaluated by me prior to discharge  MDM    Disposition  Final diagnoses:   Finger injury - Left 4th digit   Crush injury     Time reflects when diagnosis was documented in both MDM as applicable and the Disposition within this note     Time User Action Codes Description Comment    9/17/2021 12:17 PM Mil Buitrago Add [S69 90XA] Finger injury     9/17/2021 12:17 PM Mil Buitrago Modify [S69 90XA] Finger injury Left 4th digit    9/17/2021 12:18 PM [address]  8XXA] Crush injury       ED Disposition     ED Disposition Condition Date/Time Comment    Discharge Stable Fri Sep 17, 2021 12:17 PM Teresa Begum discharge to home/self care              Follow-up Information     Follow up With Specialties Details Why Contact Info Additional Information [facility] Schedule an appointment as soon as possible for a visit   [address] [facility] [address] [id number] [place] [facility] [place], [address] [place]          Discharge Medication List as of 9/17/2021 12:21 PM      START taking these medications    Details   naproxen (NAPROSYN) 500 mg tablet Take 1 tablet (500 mg total) by mouth 2 (two) times a day as needed for mild pain, Starting Fri 9/17/2021, Normal           No discharge procedures on file      PDMP Review     None          ED Provider  Electronically Signed by           Bc Sargent PA-C  09/17/21 4259

## 2021-09-17 NOTE — RESULT ENCOUNTER NOTE
Call patient advise that she has a avulsion fracture follow-up with orthopedic doctor as previously instructed remain in splint she verbalized understanding

## 2021-09-17 NOTE — Clinical Note
Rey Douglas was seen and treated in our emergency department on 9/17/2021  Diagnosis: Finger injury left 4th    [facility]    She may return on this date: 09/20/2021         If you have any questions or concerns, please don't hesitate to call        Savannah Haley PA-C    ______________________________           _______________          _______________  Hospital Representative                              Date                                Time

## 2021-09-17 NOTE — DISCHARGE INSTRUCTIONS
Wear the splint for comfort until pain is resolved    Follow-up with orthopedic listed    Determine the questions comments concerns or worsening symptoms

## 2021-09-17 NOTE — ED NOTES
D/c instructions reviewed, pt verbalized understanding and has no further questions at this time  Pt ambulatory off unit with steady gait       Trace Wolfe RN  09/17/21 0118

## 2021-10-11 ENCOUNTER — PATIENT OUTREACH (OUTPATIENT)
Dept: OBGYN CLINIC | Facility: CLINIC | Age: 26
End: 2021-10-11

## 2022-02-23 ENCOUNTER — APPOINTMENT (EMERGENCY)
Dept: RADIOLOGY | Facility: HOSPITAL | Age: 27
End: 2022-02-23
Payer: MEDICARE

## 2022-02-23 ENCOUNTER — HOSPITAL ENCOUNTER (EMERGENCY)
Facility: HOSPITAL | Age: 27
Discharge: HOME | End: 2022-02-23
Attending: EMERGENCY MEDICINE | Admitting: EMERGENCY MEDICINE
Payer: MEDICARE

## 2022-02-23 VITALS
DIASTOLIC BLOOD PRESSURE: 68 MMHG | TEMPERATURE: 98.1 F | SYSTOLIC BLOOD PRESSURE: 110 MMHG | HEIGHT: 62 IN | RESPIRATION RATE: 18 BRPM | BODY MASS INDEX: 33.13 KG/M2 | WEIGHT: 180 LBS | OXYGEN SATURATION: 99 % | HEART RATE: 70 BPM

## 2022-02-23 DIAGNOSIS — S53.126A: Primary | ICD-10-CM

## 2022-02-23 PROCEDURE — 99152 MOD SED SAME PHYS/QHP 5/>YRS: CPT | Performed by: EMERGENCY MEDICINE

## 2022-02-23 PROCEDURE — 96372 THER/PROPH/DIAG INJ SC/IM: CPT

## 2022-02-23 PROCEDURE — 99284 EMERGENCY DEPT VISIT MOD MDM: CPT

## 2022-02-23 PROCEDURE — 96361 HYDRATE IV INFUSION ADD-ON: CPT

## 2022-02-23 PROCEDURE — 96374 THER/PROPH/DIAG INJ IV PUSH: CPT

## 2022-02-23 PROCEDURE — 73070 X-RAY EXAM OF ELBOW: CPT

## 2022-02-23 PROCEDURE — 99285 EMERGENCY DEPT VISIT HI MDM: CPT | Performed by: EMERGENCY MEDICINE

## 2022-02-23 PROCEDURE — 24600 TX CLSD ELBOW DISLC W/O ANES: CPT | Performed by: EMERGENCY MEDICINE

## 2022-02-23 RX ORDER — PROPOFOL 10 MG/ML
100 INJECTION, EMULSION INTRAVENOUS ONCE
Status: COMPLETED | OUTPATIENT
Start: 2022-02-23 | End: 2022-02-23

## 2022-02-23 RX ORDER — HYDROMORPHONE HCL/PF 1 MG/ML
1 SYRINGE (ML) INJECTION ONCE
Status: COMPLETED | OUTPATIENT
Start: 2022-02-23 | End: 2022-02-23

## 2022-02-23 RX ORDER — ONDANSETRON 2 MG/ML
4 INJECTION INTRAMUSCULAR; INTRAVENOUS ONCE
Status: COMPLETED | OUTPATIENT
Start: 2022-02-23 | End: 2022-02-23

## 2022-02-23 RX ADMIN — SODIUM CHLORIDE 1000 ML: 0.9 INJECTION, SOLUTION INTRAVENOUS at 15:23

## 2022-02-23 RX ADMIN — HYDROMORPHONE HYDROCHLORIDE 1 MG: 1 INJECTION, SOLUTION INTRAMUSCULAR; INTRAVENOUS; SUBCUTANEOUS at 14:50

## 2022-02-23 RX ADMIN — ONDANSETRON 4 MG: 2 INJECTION INTRAMUSCULAR; INTRAVENOUS at 15:25

## 2022-02-23 RX ADMIN — PROPOFOL 100 MG: 10 INJECTION, EMULSION INTRAVENOUS at 15:53

## 2022-02-23 NOTE — ED PROVIDER NOTES
History  Chief Complaint   Patient presents with    Elbow Injury - Major     Patient tripped on kid's table and fell; hit elbow on floor; swelling and distortion noted to Right elbow; NO LOC/thinners/head strike      Anyi Bonilla is a 28 y o  female who presents with chief complaint of left elbow pain and deformity after a fall at home  She tripped over a small children's table (her nephew's) and fell sustaining the injury  She has intact sensation and mobility of her fingers  No head strike, no loc, no nausea, vomiting or confusion  History provided by:  Patient   used: No    Elbow Injury - Major  Location:  Elbow  Elbow location:  R elbow  Injury: yes    Time since incident:  30 minutes  Mechanism of injury: fall    Fall:     Fall occurred:  Tripped    Impact surface:  Hard floor    Point of impact:  Outstretched arms  Pain details:     Quality:  Aching and throbbing    Radiates to:  Does not radiate    Severity:  Moderate    Onset quality:  Sudden    Duration:  1 hour    Timing:  Constant  Handedness:  Left-handed  Dislocation: yes    Prior injury to area:  No  Relieved by:  Immobilization  Worsened by: Movement and stretching area      Prior to Admission Medications   Prescriptions Last Dose Informant Patient Reported? Taking?   naproxen (NAPROSYN) 500 mg tablet   No No   Sig: Take 1 tablet (500 mg total) by mouth 2 (two) times a day as needed for mild pain      Facility-Administered Medications: None       Past Medical History:   Diagnosis Date    Scoliosis        History reviewed  No pertinent surgical history  Family History   Problem Relation Age of Onset    No Known Problems Mother     No Known Problems Father     Breast cancer Maternal Grandmother      I have reviewed and agree with the history as documented      E-Cigarette/Vaping    E-Cigarette Use Never User      E-Cigarette/Vaping Substances     Social History     Tobacco Use    Smoking status: Never Smoker    Smokeless tobacco: Never Used   Vaping Use    Vaping Use: Never used   Substance Use Topics    Alcohol use: Yes     Comment: occas    Drug use: No       Review of Systems   Respiratory: Negative for shortness of breath  Cardiovascular: Negative for chest pain  Gastrointestinal: Negative for nausea and vomiting  Musculoskeletal: Positive for arthralgias (right elbow)  Skin: Negative for color change and wound  Neurological: Negative for weakness, numbness and headaches  Psychiatric/Behavioral: Negative for confusion  All other systems reviewed and are negative  Physical Exam  Physical Exam  Vitals and nursing note reviewed  Constitutional:       General: She is in acute distress  Appearance: She is well-developed  HENT:      Head: Normocephalic and atraumatic  Eyes:      Pupils: Pupils are equal, round, and reactive to light  Neck:      Vascular: No JVD  Cardiovascular:      Rate and Rhythm: Normal rate and regular rhythm  Heart sounds: Normal heart sounds  No murmur heard  No friction rub  No gallop  Pulmonary:      Effort: Pulmonary effort is normal  No respiratory distress  Breath sounds: Normal breath sounds  No wheezing or rales  Chest:      Chest wall: No tenderness  Musculoskeletal:         General: No tenderness  Right elbow: Deformity present  Decreased range of motion  Cervical back: Normal range of motion  Skin:     General: Skin is warm and dry  Neurological:      General: No focal deficit present  Mental Status: She is alert and oriented to person, place, and time  Psychiatric:         Behavior: Behavior normal          Thought Content:  Thought content normal          Judgment: Judgment normal          Vital Signs  ED Triage Vitals [02/23/22 1443]   Temperature Pulse Respirations Blood Pressure SpO2   98 1 °F (36 7 °C) 80 18 104/66 97 %      Temp Source Heart Rate Source Patient Position - Orthostatic VS BP Location FiO2 (%)   Oral Monitor Sitting Left arm --      Pain Score       10 - Worst Possible Pain           Vitals:    02/23/22 1557 02/23/22 1557 02/23/22 1600 02/23/22 1615   BP:  107/62  110/68   Pulse: 66 72 84 70   Patient Position - Orthostatic VS:  Sitting  Sitting         Visual Acuity      ED Medications  Medications   HYDROmorphone (DILAUDID) injection 1 mg (1 mg Intramuscular Given 2/23/22 1450)   propofol (DIPRIVAN) 200 MG/20ML bolus injection 100 mg (100 mg Intravenous Given by Other 2/23/22 1553)   ondansetron (ZOFRAN) injection 4 mg (4 mg Intravenous Given 2/23/22 1525)   sodium chloride 0 9 % bolus 1,000 mL (1,000 mL Intravenous New Bag 2/23/22 1523)       Diagnostic Studies  Results Reviewed     None                 XR elbow 2 vw RIGHT   ED Interpretation by Joe Rodríguez MD (02/23 1623)   This film was interpreted independently by me  Interval reduction of dislocation  XR elbow 2 vw RIGHT   ED Interpretation by Joe Rodríguez MD (02/23 1512)   This film was interpreted independently by me  Total elbow dislocation          Final Result by Almaz Sanabria MD (02/23 3618)      Posterior elbow dislocation            Workstation performed: [id number]                    Procedures  Pre-Procedural Sedation  Performed by: Joe Rodríguez MD  Authorized by: Joe Rodríguez MD     Consent:     Consent obtained:  Written    Consent given by:  Patient    Risks discussed:  Inadequate sedation, respiratory compromise necessitating ventilatory assistance and intubation and allergic reaction    Alternatives discussed:  Analgesia without sedation (attempted prior )  Universal protocol:     Procedure explained and questions answered to patient or proxy's satisfaction: yes      Patient identity confirmation method:  Verbally with patient and arm band  Indications:     Sedation purpose:  Dislocation reduction    Procedure necessitating sedation performed by:  Physician performing sedation (resident physician Dr Manuela Henry was also present)    Intended level of sedation:  Moderate (conscious sedation)  Pre-sedation assessment:     NPO status caution: urgency dictates proceeding with non-ideal NPO status      Neck mobility: normal      Mouth opening:  3 or more finger widths    Thyromental distance:  3 finger widths    Mallampati score:  II - soft palate, uvula, fauces visible    Pre-sedation assessments completed and reviewed: airway patency, mental status, nausea/vomiting and pain level      History of difficult intubation: no      Pre-sedation assessment completed:  2/23/2022 3:50 PM  Procedural Sedation    Date/Time: 2/23/2022 3:52 PM  Performed by: Fercho Gloria MD  Authorized by: Fercho Gloria MD     Immediate pre-procedure details:     Reassessment: Patient reassessed immediately prior to procedure      Reviewed: vital signs      Verified: bag valve mask available, intubation equipment available, IV patency confirmed, oxygen available and suction available    Procedure details (see MAR for exact dosages):     Sedation start time:  2/23/2022 3:52 PM    Preoxygenation:  Nasal cannula    Sedation:  Propofol    Analgesia:  Hydromorphone    Intra-procedure monitoring:  Blood pressure monitoring, cardiac monitor, continuous capnometry and continuous pulse oximetry    Intra-procedure management:  BVM ventilation (very brief 10-20 seconds)    Sedation end time:  2/23/2022 4:03 PM    Total sedation time (minutes):  13  Post-procedure details:     Post-sedation assessment completed:  2/23/2022 4:03 PM    Attendance: Constant attendance by certified staff until patient recovered      Recovery: Patient returned to pre-procedure baseline      Post-sedation assessments completed and reviewed: airway patency, cardiovascular function, mental status, nausea/vomiting, pain level and respiratory function      Patient tolerance:   Tolerated well, no immediate complications  Orthopedic injury treatment    Date/Time: 2/23/2022 4:03 PM  Performed by: Lucille Persaud MD  Authorized by: Lucille Persaud MD     Patient Location:  ED  Other Assisting Provider: Yes (comment) (Dr Dallas Velazquez)    Verbal consent obtained?: Yes    Consent given by:  Patient  Patient states understanding of procedure being performed: Yes    Patient identity confirmed:  Verbally with patient and arm band  Injury location:  Elbow  Location details:  Right elbow  Injury type:  Dislocation  Dislocation type: posterior    Distal perfusion: normal    Neurological function: normal    Range of motion: reduced    Local anesthesia used?: No    General anesthesia used?: Yes    Sedation type: Moderate (conscious) sedation (See separate Procedural Sedation form)  Manipulation performed?: Yes    Reduction method:  Direct traction and flexion  Reduction method:  Direct traction and flexion  Reduction method:  Direct traction and flexion  Reduction method:  Direct traction and flexion  Reduction method:  Direct traction and flexion  Reduction method:  Direct traction and flexion  Reduction successful?: Yes    Confirmation: Reduction confirmed by x-ray    Immobilization:  Splint and sling  Splint type:  Long arm  Neurovascular status: Neurovascularly intact    Distal perfusion: normal    Neurological function: normal    Range of motion: improved    Patient tolerance:  Patient tolerated the procedure well with no immediate complications             ED Course        I examined the patient after long arm splint was placed on the right arm by Dr Dallas Velazquez  Well placed  Normal sensation and perfusion in fingers                                           MDM  Number of Diagnoses or Management Options  Closed posterior dislocation of elbow: new and requires workup  Diagnosis management comments: Background: 28 y o  female presents with suspected elbow dislocation after a fall    Differential DX includes but is not limited to: dislocation vs fracture    Plan: imaging, pain control, attempt at reduction without sedation, if unsuccessful will sedate          Amount and/or Complexity of Data Reviewed  Tests in the radiology section of CPT®: ordered and reviewed  Independent visualization of images, tracings, or specimens: yes    Risk of Complications, Morbidity, and/or Mortality  Management options: high    Patient Progress  Patient progress: stable      Disposition  Final diagnoses:   Closed posterior dislocation of elbow     Time reflects when diagnosis was documented in both MDM as applicable and the Disposition within this note     Time User Action Codes Description Comment    2/23/2022  4:30 PM Flo Perez Add [G35 966E] Closed posterior dislocation of elbow       ED Disposition     ED Disposition Condition Date/Time Comment    Discharge Stable Wed Feb 23, 2022  4:30 PM Ever Lecher discharge to home/self care  Follow-up Information     Follow up With Specialties Details Why Contact Info Additional [address] [facility] [place] [facility] Schedule an appointment as soon as possible for a visit in 1 week  [address] [place] [address], [address] [phone]  [facility], [address] [facility] [address] [place] [address], [id number] [phone]          Patient's Medications   Discharge Prescriptions    No medications on file       No discharge procedures on file      PDMP Review     None          ED Provider  Electronically Signed by           Deyvi Valentine MD  02/23/22 7729

## 2022-02-28 ENCOUNTER — APPOINTMENT (OUTPATIENT)
Dept: RADIOLOGY | Facility: AMBULARY SURGERY CENTER | Age: 27
End: 2022-02-28
Attending: ORTHOPAEDIC SURGERY
Payer: MEDICARE

## 2022-02-28 ENCOUNTER — OFFICE VISIT (OUTPATIENT)
Dept: OBGYN CLINIC | Facility: CLINIC | Age: 27
End: 2022-02-28
Payer: MEDICARE

## 2022-02-28 VITALS — HEIGHT: 62 IN | WEIGHT: 180 LBS | BODY MASS INDEX: 33.13 KG/M2

## 2022-02-28 DIAGNOSIS — S53.104D CLOSED DISLOCATION OF RIGHT ELBOW, SUBSEQUENT ENCOUNTER: Primary | ICD-10-CM

## 2022-02-28 DIAGNOSIS — M25.521 PAIN IN RIGHT ELBOW: ICD-10-CM

## 2022-02-28 PROCEDURE — 73080 X-RAY EXAM OF ELBOW: CPT

## 2022-02-28 PROCEDURE — 99204 OFFICE O/P NEW MOD 45 MIN: CPT | Performed by: ORTHOPAEDIC SURGERY

## 2022-02-28 NOTE — PROGRESS NOTES
Assessment:  1  Closed dislocation of right elbow, subsequent encounter  XR elbow 3+ vw right    Fracture / Dislocation Treatment     Patient Active Problem List   Diagnosis    Encounter for annual routine gynecological examination    Routine screening for STI (sexually transmitted infection)    Pelvic pain    Urinary urgency    Pap smear abnormality of cervix with LGSIL    LGSIL of cervix of undetermined significance    Possible exposure to STD    Nipple discharge           Plan      Left simple elbow dislocation, s/p closed reduction in the ED (DOI: 2/23/22)    27F with left elbow dislocation, s/p closed reduction in the ED on 2/23/2022  Elbow is stable on exam in the office today, and xrays show concentric reduction of the elbow  No plan for any operative intervention  Patient was placed in a hinged elbow brace with a 40 deg extension block  She should maintain the 40 degree extension block for the next 2 weeks, and at that point she can degrease the block by 10 degrees weekly until full ROM  Referral will be placed to hand therapy to assist with ROM exercises, no strengthening  Patient should return to the office in 4 weeks for re-evaluation at that time  Subjective:     Patient ID:    Chief Complaint:Mireya Gaspar 28 y o  female      HPI    Patient comes in today with regards to right elbow pain  Patient tripped and fell on 2/23/22, injuring the right elbow  At that time she presented to the ED where she was diagnosed with an elbow dislocation and underwent closed reduction and splinting  Since that time she has been doing well  The patient reports that the pain is in the elbow  The pain is rated as moderate  The pain is described as aching    It is worsened with attempted movement, and is made better with rest       The following portions of the patient's history were reviewed and updated as appropriate: allergies, current medications, past family history, past social history, past surgical history and problem list         Social History     Socioeconomic History    Marital status: Single     Spouse name: Not on file    Number of children: 1    Years of education: 15    Highest education level: High school graduate   Occupational History    Not on file   Tobacco Use    Smoking status: Never Smoker    Smokeless tobacco: Never Used   Vaping Use    Vaping Use: Never used   Substance and Sexual Activity    Alcohol use: Yes     Comment: occas    Drug use: No    Sexual activity: Yes     Partners: Female     Birth control/protection: None   Other Topics Concern    Not on file   Social History Narrative    Not on file     Social Determinants of Health     Financial Resource Strain: Low Risk     Difficulty of Paying Living Expenses: Not hard at all   Food Insecurity: No Food Insecurity    Worried About [address] in the Last Year: Never true    [address] in the Last Year: Never true   Transportation Needs: No Transportation Needs    Lack of Transportation (Medical): No    Lack of Transportation (Non-Medical): No   Physical Activity: Not on file   Stress: No Stress Concern Present    Feeling of Stress : Not at all   Social Connections: Not on file   Intimate Partner Violence: Not At Risk    Fear of Current or Ex-Partner: No    Emotionally Abused: No    Physically Abused: No    Sexually Abused: No   Housing Stability: Low Risk     Unable to Pay for Housing in the Last Year: No    Number of Places Lived in the Last Year: 1    Unstable Housing in the Last Year: No     Past Medical History:   Diagnosis Date    Scoliosis      No past surgical history on file    No Known Allergies  Current Outpatient Medications on File Prior to Visit   Medication Sig Dispense Refill    naproxen (NAPROSYN) 500 mg tablet Take 1 tablet (500 mg total) by mouth 2 (two) times a day as needed for mild pain (Patient not taking: Reported on 2/28/2022 ) 10 tablet 0     No current facility-administered medications on file prior to visit  Objective:    Review of Systems   Constitutional: Negative for chills and fever  HENT: Negative for ear pain and sore throat  Eyes: Negative for pain and visual disturbance  Respiratory: Negative for cough and shortness of breath  Cardiovascular: Negative for chest pain and palpitations  Gastrointestinal: Negative for abdominal pain and vomiting  Genitourinary: Negative for dysuria and hematuria  Musculoskeletal: Positive for arthralgias and myalgias  Negative for back pain  Skin: Negative for color change and rash  Neurological: Negative for seizures and syncope  All other systems reviewed and are negative  Right Elbow Exam     Tenderness   The patient is experiencing tenderness in the olecranon fossa, radial head and radial capitellar joint  Range of Motion   Extension: -30   Flexion: 110   Pronation: normal Right elbow pronation: 90  Supination: normal Right elbow supination: 80      Muscle Strength   The patient has normal right elbow strength  Tests   Varus: positive (some laxity without gross instability)  Valgus: negative    Other   Erythema: absent  Scars: absent  Sensation: normal  Pulse: present            Physical Exam  Constitutional:       General: She is not in acute distress  HENT:      Head: Normocephalic and atraumatic  Right Ear: External ear normal       Left Ear: External ear normal       Nose: Nose normal       Mouth/Throat:      Mouth: Mucous membranes are moist    Eyes:      Extraocular Movements: Extraocular movements intact  Conjunctiva/sclera: Conjunctivae normal    Cardiovascular:      Rate and Rhythm: Normal rate  Pulses: Normal pulses  Pulmonary:      Effort: Pulmonary effort is normal       Breath sounds: No wheezing  Musculoskeletal:      Cervical back: Normal range of motion  No rigidity  Skin:     General: Skin is warm and dry        Capillary Refill: Capillary refill takes less than 2 seconds  Findings: No erythema  Neurological:      General: No focal deficit present  Mental Status: She is alert and oriented to person, place, and time  Mental status is at baseline  Motor: No weakness  Psychiatric:         Mood and Affect: Mood normal          Behavior: Behavior normal          Thought Content: Thought content normal          Judgment: Judgment normal          Fracture / Dislocation Treatment    Date/Time: 2/28/2022 5:13 PM  Performed by: Araceli Castillo DO  Authorized by: Araceli Castillo DO              I have personally reviewed pertinent films in PACS and my interpretation is ap, lat, and int/ext oblique views of the elbow show concentric reduction of the elbow without fracture         Portions of the record may have been created with voice recognition software   Occasional wrong word or "sound a like" substitutions may have occurred due to the inherent limitations of voice recognition software   Read the chart carefully and recognize, using context, where substitutions have occurred

## 2022-02-28 NOTE — LETTER
February 28, 2022     Patient: Victor M Epperson   YOB: 1995   Date of Visit: 2/28/2022       To Whom it May Concern:    Victor M Epperson is under my professional care  She was seen in my office on 2/28/2022  She may not use the right arm until seen again in our office in 4 weeks  If you have any questions or concerns, please don't hesitate to call           Sincerely,          Timbo Epstein DO        CC: No Recipients

## 2022-03-28 ENCOUNTER — OFFICE VISIT (OUTPATIENT)
Dept: OBGYN CLINIC | Facility: CLINIC | Age: 27
End: 2022-03-28
Payer: MEDICARE

## 2022-03-28 VITALS
WEIGHT: 180 LBS | DIASTOLIC BLOOD PRESSURE: 76 MMHG | HEART RATE: 76 BPM | HEIGHT: 62 IN | BODY MASS INDEX: 33.13 KG/M2 | SYSTOLIC BLOOD PRESSURE: 113 MMHG

## 2022-03-28 DIAGNOSIS — S53.104D CLOSED DISLOCATION OF RIGHT ELBOW, SUBSEQUENT ENCOUNTER: Primary | ICD-10-CM

## 2022-03-28 PROCEDURE — 99212 OFFICE O/P EST SF 10 MIN: CPT | Performed by: ORTHOPAEDIC SURGERY

## 2022-03-28 NOTE — PROGRESS NOTES
Assessment:  No diagnosis found  Patient Active Problem List   Diagnosis    Encounter for annual routine gynecological examination    Routine screening for STI (sexually transmitted infection)    Pelvic pain    Urinary urgency    Pap smear abnormality of cervix with LGSIL    LGSIL of cervix of undetermined significance    Possible exposure to STD    Nipple discharge           Plan      ***            Subjective:     Patient ID:    Chief Complaint:Mireya Gilliam Page 28 y o  female      HPI    Patient comes in today with regards to ***  The patient reports that the pain is in the *** and has been going on for ***  The pain is rated at{Pain Score 0-10, 0 default:04885} at its best and{Pain Score 0-10, 0 default:58056} at its worst   The pain is described as ***  It is worsened with ***, and is made better with ***  The patient has taken *** for treatment        The following portions of the patient's history were reviewed and updated as appropriate: allergies, current medications, past family history, past social history, past surgical history and problem list         Social History     Socioeconomic History    Marital status: Single     Spouse name: Not on file    Number of children: 1    Years of education: 15    Highest education level: High school graduate   Occupational History    Not on file   Tobacco Use    Smoking status: Never Smoker    Smokeless tobacco: Never Used   Vaping Use    Vaping Use: Never used   Substance and Sexual Activity    Alcohol use: Yes     Comment: occas    Drug use: No    Sexual activity: Yes     Partners: Female     Birth control/protection: None   Other Topics Concern    Not on file   Social History Narrative    Not on file     Social Determinants of Health     Financial Resource Strain: Low Risk     Difficulty of Paying Living Expenses: Not hard at all   Food Insecurity: No Food Insecurity    Worried About [address] in the Last Year: Never true    Ran Out of Food in the Last Year: Never true   Transportation Needs: No Transportation Needs    Lack of Transportation (Medical): No    Lack of Transportation (Non-Medical): No   Physical Activity: Not on file   Stress: No Stress Concern Present    Feeling of Stress : Not at all   Social Connections: Not on file   Intimate Partner Violence: Not At Risk    Fear of Current or Ex-Partner: No    Emotionally Abused: No    Physically Abused: No    Sexually Abused: No   Housing Stability: Low Risk     Unable to Pay for Housing in the Last Year: No    Number of Places Lived in the Last Year: 1    Unstable Housing in the Last Year: No     Past Medical History:   Diagnosis Date    Scoliosis      No past surgical history on file  No Known Allergies  Current Outpatient Medications on File Prior to Visit   Medication Sig Dispense Refill    naproxen (NAPROSYN) 500 mg tablet Take 1 tablet (500 mg total) by mouth 2 (two) times a day as needed for mild pain (Patient not taking: Reported on 2/28/2022 ) 10 tablet 0     No current facility-administered medications on file prior to visit  Objective:    Review of Systems   Constitutional: Negative for chills, fever and unexpected weight change  HENT: Negative for hearing loss, nosebleeds and sore throat  Eyes: Negative for pain, redness and visual disturbance  Respiratory: Negative for cough, shortness of breath and wheezing  Cardiovascular: Negative for chest pain, palpitations and leg swelling  Gastrointestinal: Negative for abdominal pain and nausea  Genitourinary: Negative for dyspareunia, dysuria and frequency  Skin: Negative for rash and wound  Neurological: Negative for dizziness, numbness and headaches  Psychiatric/Behavioral: Negative for decreased concentration and suicidal ideas  The patient is not nervous/anxious  Ortho Exam    Physical Exam  Constitutional:       Appearance: She is well-developed     HENT:      Head: Normocephalic  Eyes:      Conjunctiva/sclera: Conjunctivae normal    Cardiovascular:      Rate and Rhythm: Normal rate  Pulmonary:      Effort: Pulmonary effort is normal    Musculoskeletal:      Cervical back: Normal range of motion  Skin:     General: Skin is warm and dry  Neurological:      Mental Status: She is alert and oriented to person, place, and time  Procedures  {Was [facility] NQOD:81989::"[facility] Procedures performed today"}    {Imaging Review Statement:[id number]}     Scribe Attestation    I,:  Ranjeet Myers am acting as a scribe while in the presence of the attending physician :       I,:  Mannie Pastor DO personally performed the services described in this documentation    as scribed in my presence :             Portions of the record may have been created with voice recognition software   Occasional wrong word or "sound a like" substitutions may have occurred due to the inherent limitations of voice recognition software   Read the chart carefully and recognize, using context, where substitutions have occurred

## 2022-03-28 NOTE — PROGRESS NOTES
Assessment:  1  Closed dislocation of right elbow, subsequent encounter  Ambulatory Referral to PT/OT Hand Therapy     Patient Active Problem List   Diagnosis    Encounter for annual routine gynecological examination    Routine screening for STI (sexually transmitted infection)    Pelvic pain    Urinary urgency    Pap smear abnormality of cervix with LGSIL    LGSIL of cervix of undetermined significance    Possible exposure to STD    Nipple discharge           Plan      RIGHT simple elbow dislocation, s/p closed reduction in the ED (DOI: 2/23/22)    - on exam patient has limited flexion extension of the right elbow  - at this time is recommended to get into occupational therapy to work on her range of motion  She can start to work on range of motion 2 weeks or when she obtains close to full functional range of motion  - she is to wear the I ROM when she is in public but otherwise does not need to use it  - We will follow-up with patient 4 weeks            Subjective:     Patient ID:    Chief Complaint:Mireya Schultz 28 y o  female      HPI    Patient is here for 4 week follow-up in regards to her right elbow dislocation, status post close reduction in the ED on 02/23/2022  Patient has been treating in a hinged elbow brace with a 40 degree extension block  She notes that she has some soreness on the lateral aspect of the elbow         The following portions of the patient's history were reviewed and updated as appropriate: allergies, current medications, past family history, past social history, past surgical history and problem list     All organ systems normal    Social History     Socioeconomic History    Marital status: Single     Spouse name: Not on file    Number of children: 1    Years of education: 15    Highest education level: High school graduate   Occupational History    Not on file   Tobacco Use    Smoking status: Never Smoker    Smokeless tobacco: Never Used   Vaping Use    Vaping Use: Never used   Substance and Sexual Activity    Alcohol use: Yes     Comment: occas    Drug use: No    Sexual activity: Yes     Partners: Female     Birth control/protection: None   Other Topics Concern    Not on file   Social History Narrative    Not on file     Social Determinants of Health     Financial Resource Strain: Low Risk     Difficulty of Paying Living Expenses: Not hard at all   Food Insecurity: No Food Insecurity    Worried About [address] in the Last Year: Never true    [address] [employer] in the Last Year: Never true   Transportation Needs: No Transportation Needs    Lack of Transportation (Medical): No    Lack of Transportation (Non-Medical): No   Physical Activity: Not on file   Stress: No Stress Concern Present    Feeling of Stress : Not at all   Social Connections: Not on file   Intimate Partner Violence: Not At Risk    Fear of Current or Ex-Partner: No    Emotionally Abused: No    Physically Abused: No    Sexually Abused: No   Housing Stability: Low Risk     Unable to Pay for Housing in the Last Year: No    Number of Places Lived in the Last Year: 1    Unstable Housing in the Last Year: No     Past Medical History:   Diagnosis Date    Scoliosis      No past surgical history on file  No Known Allergies  Current Outpatient Medications on File Prior to Visit   Medication Sig Dispense Refill    naproxen (NAPROSYN) 500 mg tablet Take 1 tablet (500 mg total) by mouth 2 (two) times a day as needed for mild pain (Patient not taking: Reported on 2/28/2022 ) 10 tablet 0     No current facility-administered medications on file prior to visit  Objective:        Right Elbow Exam     Tenderness   The patient is experiencing tenderness in the lateral epicondyle  Range of Motion   Extension: -20   Flexion: 90   Pronation: normal   Supination: normal     Muscle Strength   The patient has normal right elbow strength      Tests   Varus: negative  Valgus: negative    Other   Erythema: absent  Sensation: normal  Pulse: present                No images reviewed at today's visit   Scribe Attestation    I,:  Madelaine Samaniego am acting as a scribe while in the presence of the attending physician :       I,:  Kathy Walden, DO personally performed the services described in this documentation    as scribed in my presence :           Portions of the record may have been created with voice recognition software   Occasional wrong word or "sound a like" substitutions may have occurred due to the inherent limitations of voice recognition software   Read the chart carefully and recognize, using context, where substitutions have occurred

## 2022-03-29 ENCOUNTER — TELEPHONE (OUTPATIENT)
Dept: OBGYN CLINIC | Facility: CLINIC | Age: 27
End: 2022-03-29

## 2022-03-29 NOTE — TELEPHONE ENCOUNTER
Dr Carlos A Garcia called to request a return to work note without restrictions for 3/29/22    Please call once ready for her to  at  or provide appropriate fax #   [phone] Thank you

## 2022-04-12 ENCOUNTER — EVALUATION (OUTPATIENT)
Dept: OCCUPATIONAL THERAPY | Facility: CLINIC | Age: 27
End: 2022-04-12
Payer: MEDICARE

## 2022-04-12 DIAGNOSIS — S53.104D DISLOCATION OF RIGHT ELBOW, SUBSEQUENT ENCOUNTER: Primary | ICD-10-CM

## 2022-04-12 PROCEDURE — 97165 OT EVAL LOW COMPLEX 30 MIN: CPT

## 2022-04-12 PROCEDURE — 97110 THERAPEUTIC EXERCISES: CPT

## 2022-04-12 NOTE — PROGRESS NOTES
OT Evaluation     Today's date: 2022  Patient name: Toni Benavides  : 1995  MRN: [id number]  Referring provider: Dat Green DO  Dx:   Encounter Diagnosis     ICD-10-CM    1  Dislocation of right elbow, subsequent encounter  S53 104D                   Assessment  Assessment details: Toni Benavides is a 28 y o  female who presents with right elbow dislocation s/p close reduction, referred by Dr Wale Mchugh 22  She tripped over [employer] and fell dislocating her joint  She was reduced in the ED  Treated conservatively in a hinged brace which she discontinued at home on 3/28  Referred to OPOT at most recent f/u  Now 7 weeks out from dislocation  Patient presented to therapy with below listed impairments, including primarily joint stiffness, limited AROM/PROM, reduced strength, pain which impact participation in ADL/IADL  Toin Benavides would benefit from skilled OPOT as recommended to address these deficits, return to best level of function, and maximize (I) in ADL/IADL  Impairments: abnormal or restricted ROM, impaired physical strength, lacks appropriate home exercise program, pain with function and weight-bearing intolerance    Symptom irritability: lowUnderstanding of Dx/Px/POC: good   Prognosis: good    Goals  STG  1  Patient will have pain reduction for function by 2 levels in 4-6 weeks  2  Patient will demonstrate improved strength by 10# for function in 4-6 weeks  3  Patient will be (I) with HEP in 1-2 visits  4  Patient will improve AROM for function at elbow by 25* arc in 4-6 weeks       LTG  Improved ADL/IADL and work skills by discharge  Decreased pain to <3/10 for function by discharge    Plan  Patient would benefit from: custom splinting and skilled occupational therapy  Planned modality interventions: thermotherapy: hydrocollator packs, cryotherapy, ultrasound, iontophoresis and thermotherapy: paraffin bath  Planned therapy interventions: orthotic fitting/training, neuromuscular re-education, Linda taping, massage, manual therapy, joint mobilization, strengthening, stretching, therapeutic exercise, therapeutic activities, home exercise program, graded exercise, functional ROM exercises, fine motor coordination training and activity modification  Frequency: 2x week  Duration in visits: 10  Plan of Care beginning date: 2022  Plan of Care expiration date: 2022  Treatment plan discussed with: patient        Subjective Evaluation    History of Present Illness  Mechanism of injury: Tripped and fell on , dislocated elbow   Pain  Current pain ratin  At worst pain ratin    Social Support    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[employer])  Hand dominance: left    Treatments  Current treatment: occupational therapy  Patient Goals  Patient goals for therapy: increased motion          Objective     Palpation     Right   Tenderness of the biceps, brachioradialis and supinator  Tenderness     Right Elbow   Tenderness in the antecubital fossa and proximal radioulnar joint  Right Wrist/Hand   Tenderness in the proximal radioulnar joint       Neurological Testing     Additional Neurological Details  Subjectively  Negative for paresthesias     Active Range of Motion     Right Elbow   Flexion: 70 degrees with pain  Extension: 40 degrees with pain  Forearm supination: 85 degrees   Forearm pronation: 90 degrees     Left Wrist   Normal active range of motion    Right Wrist   Normal active range of motion    Strength/Myotome Testing     Left Wrist/Hand      (2nd hand position)     Trial 1: 47 4    Trial 2: 53 5    Trial 3: 48 1    Right Wrist/Hand      (2nd hand position)     Trial 1: 30 9    Trial 2: 39 5    Trial 3: 40 1             Precautions: universal      Manuals             IASTM                                                    Neuro Re-Ed Ther Ex HEP            Elbow AROM x10            Elbow AAROM x10            Elbow PROM gentle            Overhead flexion x10            Supine extension stretch x10            Towel slides tabletop             Cone stacking             Towel slides wall                          Ther Activity                                                                               Modalities             MHP 5

## 2022-04-21 ENCOUNTER — OFFICE VISIT (OUTPATIENT)
Dept: OCCUPATIONAL THERAPY | Facility: CLINIC | Age: 27
End: 2022-04-21
Payer: MEDICARE

## 2022-04-21 DIAGNOSIS — S53.104D DISLOCATION OF RIGHT ELBOW, SUBSEQUENT ENCOUNTER: Primary | ICD-10-CM

## 2022-04-21 PROCEDURE — 97110 THERAPEUTIC EXERCISES: CPT | Performed by: OCCUPATIONAL THERAPIST

## 2022-04-21 PROCEDURE — 97140 MANUAL THERAPY 1/> REGIONS: CPT | Performed by: OCCUPATIONAL THERAPIST

## 2022-04-21 NOTE — PROGRESS NOTES
Daily Note     Today's date: 2022  Patient name: Aranza Malone  : 1995  MRN: [id number]  Referring provider: Trevon Casey DO  Dx:   Encounter Diagnosis     ICD-10-CM    1  Dislocation of right elbow, subsequent encounter  S53 104D                   Subjective: She states that due to her busy work schedule she has only been completing exercises 1x per day and reports that she feels that her elbow is "the same"  Objective: See treatment diary below  EE pre= -25, post=-23  EF pre= 78, post= 94    Assessment: Significant stiffness in the elbow  Co-contraction of tricep during elbow flexion  Encouraged more consistent and frequent performance of HEP and stretching to maximize gains  Plan: Progress treatment as tolerated         Precautions: universal      Manuals            IASTM             Joint mobs  5'                                     Neuro Re-Ed             Contract relax for EF  10x                                                                                         Ther Ex HEP            Elbow AROM x10            Elbow AAROM x10            Elbow PROM gentle            Overhead flexion x10            Supine extension stretch x10            EF and SF gems overhead  15x           Cone stacking             Towel slides wall  10x, up and sideways           Pulleys  3 mins           Bicep stretch on counter  2x, 60 seconds                                     Ther Activity                                                                               Modalities             MHP 5 5' with LLPS 1 5# for EE

## 2022-04-22 ENCOUNTER — OFFICE VISIT (OUTPATIENT)
Dept: OBGYN CLINIC | Facility: CLINIC | Age: 27
End: 2022-04-22
Payer: MEDICARE

## 2022-04-22 VITALS — HEIGHT: 62 IN | BODY MASS INDEX: 33.13 KG/M2 | WEIGHT: 180 LBS

## 2022-04-22 DIAGNOSIS — S53.104D CLOSED DISLOCATION OF RIGHT ELBOW, SUBSEQUENT ENCOUNTER: Primary | ICD-10-CM

## 2022-04-22 PROBLEM — S53.104A CLOSED DISLOCATION OF RIGHT ELBOW: Status: ACTIVE | Noted: 2022-04-22

## 2022-04-22 PROCEDURE — 99213 OFFICE O/P EST LOW 20 MIN: CPT | Performed by: ORTHOPAEDIC SURGERY

## 2022-04-22 NOTE — LETTER
April 22, 2022     Patient: Yogi Davis  YOB: 1995  Date of Visit: 4/22/2022      To Whom it May Concern:    Yogi Davis is under my professional care  Greg Lim was seen in my office on 4/22/2022  Greg Lim should be excused from work missed as a result of today's appointment  If you have any questions or concerns, please don't hesitate to call           Sincerely,          Elaina Campoverde DO        CC: No Recipients

## 2022-04-22 NOTE — PROGRESS NOTES
Assessment:  1  Closed dislocation of right elbow, subsequent encounter       Patient Active Problem List   Diagnosis    Encounter for annual routine gynecological examination    Routine screening for STI (sexually transmitted infection)    Pelvic pain    Urinary urgency    Pap smear abnormality of cervix with LGSIL    LGSIL of cervix of undetermined significance    Possible exposure to STD    Nipple discharge    Closed dislocation of right elbow       Plan:    28 y o  female  with healing right elbow dislocation date of injury approximately 2 months ago     Will continue physical therapy to work on range of motion of the elbow   She may return to work and activities as tolerated   She will contact us if she is not significantly improved after 6 weeks of PT      The assessment and plan were formulated by Dr Nohemi Mrotensen and I assisted in carrying it out  Subjective:   Patient ID: Jamal Sanders is a 28 y o  female   HPI    Patient presents to the office for follow up of right elbow dislocation  Since the last visit, the patient reports improved pain in the elbow  Still some soreness and stiffness most after physical therapy  She has been to PT for about 2 weeks  She complains of some elbow stiffness worse with flexion and extension  Otherwise she is doing well no numbness or tingling        The following portions of the patient's history were reviewed and updated as appropriate: allergies, current medications, past family history, past social history, past surgical history and problem list     Social History     Socioeconomic History    Marital status: Single     Spouse name: Not on file    Number of children: 1    Years of education: 15    Highest education level: High school graduate   Occupational History    Not on file   Tobacco Use    Smoking status: Never Smoker    Smokeless tobacco: Never Used   Vaping Use    Vaping Use: Never used   Substance and Sexual Activity    Alcohol use: Yes     Comment: occas    Drug use: No    Sexual activity: Yes     Partners: Female     Birth control/protection: None   Other Topics Concern    Not on file   Social History Narrative    Not on file     Social Determinants of Health     Financial Resource Strain: Low Risk     Difficulty of Paying Living Expenses: Not hard at all   Food Insecurity: No Food Insecurity    Worried About Running Out of Food in the Last Year: Never true    Anup of Food in the Last Year: Never true   Transportation Needs: No Transportation Needs    Lack of Transportation (Medical): No    Lack of Transportation (Non-Medical): No   Physical Activity: Not on file   Stress: No Stress Concern Present    Feeling of Stress : Not at all   Social Connections: Not on file   Intimate Partner Violence: Not At Risk    Fear of Current or Ex-Partner: No    Emotionally Abused: No    Physically Abused: No    Sexually Abused: No   Housing Stability: Low Risk     Unable to Pay for Housing in the Last Year: No    Number of Places Lived in the Last Year: 1    Unstable Housing in the Last Year: No     Past Medical History:   Diagnosis Date    Scoliosis      No past surgical history on file  No Known Allergies  Current Outpatient Medications on File Prior to Visit   Medication Sig Dispense Refill    naproxen (NAPROSYN) 500 mg tablet Take 1 tablet (500 mg total) by mouth 2 (two) times a day as needed for mild pain (Patient not taking: Reported on 2/28/2022 ) 10 tablet 0     No current facility-administered medications on file prior to visit  Review of Systems  See HPi    Objective: There were no vitals filed for this visit  Physical Exam  Constitutional:       Appearance: She is well-developed  HENT:      Head: Normocephalic and atraumatic  Eyes:      General: No scleral icterus       Conjunctiva/sclera: Conjunctivae normal    Cardiovascular:      Comments: No discernible arrhthymias  Pulmonary:      Effort: Pulmonary effort is normal  No respiratory distress  Breath sounds: No stridor  Abdominal:      General: There is no distension  Palpations: Abdomen is soft  Musculoskeletal:      Cervical back: Neck supple  Skin:     General: Skin is warm and dry  Findings: No erythema  Neurological:      Mental Status: She is alert and oriented to person, place, and time  Psychiatric:         Behavior: Behavior normal          Right Elbow Exam     Range of Motion   Extension:  20 abnormal   Flexion:  90 abnormal     Muscle Strength   The patient has normal right elbow strength  Tests   Varus: negative  Valgus: negative    Other   Erythema: absent  Scars: absent  Sensation: normal  Pulse: present    Comments:  Rotationally stable as well            I have personally reviewed pertinent films in PACS  Procedures  No Procedures performed today    Portions of the record may have been created with voice recognition software  Occasional wrong word or "sound a like" substitutions may have occurred due to the inherent limitations of voice recognition software  Read the chart carefully and recognize, using context, where substitutions have occurred

## 2022-04-28 ENCOUNTER — OFFICE VISIT (OUTPATIENT)
Dept: OCCUPATIONAL THERAPY | Facility: CLINIC | Age: 27
End: 2022-04-28
Payer: MEDICARE

## 2022-04-28 DIAGNOSIS — S53.104D DISLOCATION OF RIGHT ELBOW, SUBSEQUENT ENCOUNTER: Primary | ICD-10-CM

## 2022-04-28 PROCEDURE — 97110 THERAPEUTIC EXERCISES: CPT

## 2022-04-28 PROCEDURE — 97140 MANUAL THERAPY 1/> REGIONS: CPT

## 2022-04-28 NOTE — PROGRESS NOTES
Daily Note     Today's date: 2022  Patient name: Sherwin Lobo  : 1995  MRN: [id number]  Referring provider: Lu Dunbar DO  Dx:   Encounter Diagnosis     ICD-10-CM    1  Dislocation of right elbow, subsequent encounter  S53 104D        Start Time: 171  Stop Time: 1800  Total time in clinic (min): 49 minutes    Subjective: She states that due to her busy work schedule she has only been completing exercises 1x per day and reports that she feels that her elbow is "the same"  Objective: See treatment diary below  Assessment: Patient tolerated session well  Session focused on stretches, range of motion, and manual techniques to increase ability to perform ADLs independently  Patient tolerated new range of motion and endurance exercises well  Patient instructed on new technique for self stretching of elbow flexion and was able to demonstrate  Patient benefiting from occupational therapy to increase ability to perform ADLs  Plan: Progress treatment as tolerated         Precautions: universal      Manuals           IASTM   7'          Joint mobs  5'           Cupping    3'                       Neuro Re-Ed             Contract relax for EF  10x 10x                                                                                        Ther Ex HEP            Elbow AROM x10  x10          Elbow AAROM x10  3'          Elbow PROM gentle  8'          Overhead flexion x10            Supine extension stretch x10            EF and SF gems overhead  15x           Cone stacking             Towel slides wall  10x, up and sideways 10x          Pulleys  3 mins 3'          Bicep stretch on counter  2x, 60 seconds 60 secs x3          UBE   6': 3 each                       Ther Activity                                                                               Modalities             MHP 5 5' with LLPS 1 5# for EE 5' 1 5# for EE

## 2022-05-05 ENCOUNTER — OFFICE VISIT (OUTPATIENT)
Dept: OCCUPATIONAL THERAPY | Facility: CLINIC | Age: 27
End: 2022-05-05
Payer: MEDICARE

## 2022-05-05 DIAGNOSIS — S53.104D DISLOCATION OF RIGHT ELBOW, SUBSEQUENT ENCOUNTER: Primary | ICD-10-CM

## 2022-05-05 PROCEDURE — 97140 MANUAL THERAPY 1/> REGIONS: CPT

## 2022-05-05 PROCEDURE — 97110 THERAPEUTIC EXERCISES: CPT

## 2022-05-05 NOTE — PROGRESS NOTES
Daily Note     Today's date: 2022  Patient name: Betito Avila  : 1995  MRN: [id number]  Referring provider: Aicha Penn DO  Dx:   Encounter Diagnosis     ICD-10-CM    1  Dislocation of right elbow, subsequent encounter  S53 104D        Start Time: 1630  Stop Time: 1720  Total time in clinic (min): 50 minutes    Subjective: Patient reported that she is now able to reach behind her head and put her hair up  Objective: See treatment diary below  Assessment: Patient tolerated session well  Session focused on stretches, range of motion, and manual techniques to increase ability to perform ADLs independently  Patient tolerated new range of motion exercise well  Increased elbow flexion noted with therapist assisted passive range of motion  Patient benefiting from occupational therapy to increase ability to perform ADLs  Plan: Progress treatment as tolerated         Precautions: universal      Manuals  5         IASTM   7' 5'         Joint mobs  5'           Cupping    3' 3'                      Neuro Re-Ed             Contract relax for EF  10x 10x                                                                                        Ther Ex HEP            Elbow AROM x10  x10 x10         Elbow AAROM x10  3' 3'         Elbow PROM gentle  8' 8'         Overhead flexion x10   Wand chest press/overhead x10         Supine extension stretch x10            EF and SF gems overhead  15x           Cone stacking             Towel slides wall  10x, up and sideways 10x 15x         Pulleys  3 mins 3' 3'         Bicep stretch on counter  2x, 60 seconds 60 secs x3 60 sec x3         UBE   6': 3 each 6': 3 each                      Ther Activity                                                                               Modalities             MHP 5 5' with LLPS 1 5# for EE 5' 1 5# for EE 5' 3# for EE

## 2022-05-10 ENCOUNTER — OFFICE VISIT (OUTPATIENT)
Dept: OCCUPATIONAL THERAPY | Facility: CLINIC | Age: 27
End: 2022-05-10
Payer: MEDICARE

## 2022-05-10 DIAGNOSIS — S53.104D DISLOCATION OF RIGHT ELBOW, SUBSEQUENT ENCOUNTER: Primary | ICD-10-CM

## 2022-05-10 PROCEDURE — 97140 MANUAL THERAPY 1/> REGIONS: CPT

## 2022-05-10 PROCEDURE — 97110 THERAPEUTIC EXERCISES: CPT

## 2022-05-10 PROCEDURE — 97530 THERAPEUTIC ACTIVITIES: CPT

## 2022-05-10 NOTE — PROGRESS NOTES
Daily Note     Today's date: 5/10/2022  Patient name: Evelyn Brannon  : 1995  MRN: [id number]  Referring provider: Fermin Chu DO  Dx:   Encounter Diagnosis     ICD-10-CM    1  Dislocation of right elbow, subsequent encounter  S53 104D        Start Time: 1353  Stop Time: 1450  Total time in clinic (min): 57 minutes    Subjective: "Last night it kept cracking and I kept moving it and I was able to get my fingers to touch my shoulder "      Objective: See treatment diary below  Significant increases in elbow flexion post heat/stretch compared to last session      Assessment: Patient tolerated session well  Session focused on stretches, range of motion, and manual techniques to increase ability to perform ADLs independently  Patient tolerated new range of motion exercise well  She tolerated initiation of gentle strengthening exercises without complaints of pain  Patient benefiting from occupational therapy to increase ability to perform ADLs  Plan: Progress treatment as tolerated         Precautions: universal      Manuals 4/12 4/21 4/28 5/5 5/10        IASTM   7' 5' 5'        Joint mobs  5'           Cupping    3' 3' 3'                     Neuro Re-Ed             Contract relax for EF  10x 10x                                                                                        Ther Ex HEP            Elbow AROM x10  x10 x10         Elbow AAROM x10  3' 3' 3'        Elbow PROM gentle  8' 8' 8'        Overhead flexion x10   Wand chest press/overhead x10 Wand chest press/overhead x10        Supine extension stretch x10            EF and SF gems overhead  15x           Cone stacking             Towel slides wall  10x, up and sideways 10x 15x 15x 1# wrist weight        Pulleys  3 mins 3' 3' 3'        Bicep stretch on counter  2x, 60 seconds 60 secs x3 60 sec x3 60 sec x3        UBE   6': 3 each 6': 3 each 6' 3 each        Wrist PREs F/E/RD     2 lb x10                     Ther Activity             Wrist maze     5x        Theraband flex bar bends     Yellow 2x10                                                Modalities             MHP 5 5' with LLPS 1 5# for EE 5' 1 5# for EE 5' 3# for EE 5' 3# for EE

## 2022-05-12 ENCOUNTER — OFFICE VISIT (OUTPATIENT)
Dept: OCCUPATIONAL THERAPY | Facility: CLINIC | Age: 27
End: 2022-05-12
Payer: MEDICARE

## 2022-05-12 DIAGNOSIS — S53.104D DISLOCATION OF RIGHT ELBOW, SUBSEQUENT ENCOUNTER: Primary | ICD-10-CM

## 2022-05-12 PROCEDURE — 97530 THERAPEUTIC ACTIVITIES: CPT

## 2022-05-12 PROCEDURE — 97140 MANUAL THERAPY 1/> REGIONS: CPT

## 2022-05-12 PROCEDURE — 97110 THERAPEUTIC EXERCISES: CPT

## 2022-05-12 NOTE — PROGRESS NOTES
Daily Note     Today's date: 2022  Patient name: Pao Roper  : 1995  MRN: [id number]  Referring provider: Lorri Cintron DO  Dx:   Encounter Diagnosis     ICD-10-CM    1  Dislocation of right elbow, subsequent encounter  S53 104D        Start Time: 3184  Stop Time: 1500  Total time in clinic (min): 40 minutes    Subjective: Patient offered no change in function      Objective: See treatment diary below  Assessment: Patient tolerated session well  Session focused on stretches, range of motion, and manual techniques to increase ability to perform ADLs independently  Patient tolerated new strengthening exercises well  Provided home exercise program for strengthening to be performed once daily  Patient demonstrated understanding  Patient benefiting from occupational therapy to increase ability to perform ADLs  Plan: Progress treatment as tolerated         Precautions: universal      Manuals 4/12 4/21 4/28 5/5 5/10 5/12       IASTM   7' 5' 5' 5'       Joint mobs  5'           Cupping    3' 3' 3' 3'                    Neuro Re-Ed             Contract relax for EF  10x 10x                                                                                        Ther Ex HEP            Elbow AROM x10  x10 x10         Elbow AAROM x10  3' 3' 3'        Elbow PROM gentle  8' 8' 8' 8'       Overhead flexion x10   Wand chest press/overhead x10 Wand chest press/overhead x10        Supine extension stretch x10            EF and SF gems overhead  15x           Cone stacking             Towel slides wall  10x, up and sideways 10x 15x 15x 1# wrist weight 15x 2# ww       Pulleys  3 mins 3' 3' 3' 3'       Bicep stretch on counter  2x, 60 seconds 60 secs x3 60 sec x3 60 sec x3        UBE   6': 3 each 6': 3 each 6' 3 each        Wrist PREs F/E/RD     2 lb x10 2 lb x10       Theraband- sh ext, triceps, rows                          Ther Activity             Wrist maze     5x 5x       Theraband flex bar bends Yellow 2x10 Yellow 2x10                                               Modalities             MHP 5 5' with LLPS 1 5# for EE 5' 1 5# for EE 5' 3# for EE 5' 3# for EE 5' 3# for EE

## 2022-05-17 ENCOUNTER — EVALUATION (OUTPATIENT)
Dept: OCCUPATIONAL THERAPY | Facility: CLINIC | Age: 27
End: 2022-05-17
Payer: MEDICARE

## 2022-05-17 DIAGNOSIS — S53.104D DISLOCATION OF RIGHT ELBOW, SUBSEQUENT ENCOUNTER: Primary | ICD-10-CM

## 2022-05-17 PROCEDURE — 97110 THERAPEUTIC EXERCISES: CPT

## 2022-05-17 PROCEDURE — 97530 THERAPEUTIC ACTIVITIES: CPT

## 2022-05-17 NOTE — PROGRESS NOTES
OT Re-Evaluation     Today's date: 2022  Patient name: Dharmesh Hermosillo  : 1995  MRN: [id number]  Referring provider: Bessy Baptiste DO  Dx:   Encounter Diagnosis     ICD-10-CM    1  Dislocation of right elbow, subsequent encounter  S53 104D        Start Time: 5360  Stop Time: 1355  Total time in clinic (min): 52 minutes    Assessment  Assessment details: Dharmesh Hermosillo is a 28 y o  female who presents with right elbow dislocation s/p close reduction, referred by Dr Carissa Simeon 22  She tripped over [employer] and fell dislocating her joint  She was reduced in the ED  Treated conservatively in a hinged brace which she discontinued at home on 3/28  Patient presented with increased range of motion and strength since last assessed  She continues with mild stiffness in elbow flexion  Impairments: abnormal or restricted ROM    Symptom irritability: lowUnderstanding of Dx/Px/POC: good   Prognosis: good    Goals  STG  1  Patient will have pain reduction for function by 2 levels in 4-6 weeks -MET  2  Patient will demonstrate improved strength by 10# for function in 4-6 weeks -MET  3  Patient will be (I) with HEP in 1-2 visits -MET  4  Patient will improve AROM for function at elbow by 25* arc in 4-6 weeks  -MET    New ST  Patient will improve elbow flexion/extension to 140/-8 degrees in 4 weeks    LTG  Improved ADL/IADL and work skills by discharge  Decreased pain to <3/10 for function by discharge    Plan  Patient would benefit from: skilled occupational therapy  Planned modality interventions: thermotherapy: hydrocollator packs and ultrasound  Planned therapy interventions: Mckeon taping, massage, manual therapy, joint mobilization, strengthening, stretching, therapeutic exercise, therapeutic activities, home exercise program, graded exercise, functional ROM exercises, fine motor coordination training and activity modification  Frequency: 1-2x/week    Duration in weeks: 8  Plan of Care beginning date: 2022  Plan of Care expiration date: 2022  Treatment plan discussed with: patient        Subjective Evaluation    History of Present Illness  Mechanism of injury: Patient reported that she is more easily able to flex her elbow  "I'm able to reach behind my head now  I can sleep good now " Patient reported that she has had some minimal numbness in her small finger when her arm is bent during sleep     Pain  At worst pain ratin    Social Support    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[employer])  Hand dominance: left    Treatments  Current treatment: occupational therapy  Patient Goals  Patient goals for therapy: increased motion          Objective     Neurological Testing     Additional Neurological Details  Subjectively  Negative for paresthesias     Active Range of Motion     Right Elbow   Flexion: 138 degrees with pain  Extension: -20 degrees with pain    Left Wrist   Normal active range of motion    Right Wrist   Normal active range of motion    Strength/Myotome Testing     Right Elbow   Flexion: 4+  Extension: 4+    Left Wrist/Hand      (2nd hand position)     Trial 1: 44 5    Right Wrist/Hand      (2nd hand position)     Trial 1: 48             Precautions: universal      Manuals  5/5 5/10 5/12 5/17      IASTM   7' 5' 5' 5' 5'      Joint mobs  5'           Cupping    3' 3' 3' 3' 3'                   Neuro Re-Ed             Contract relax for EF  10x 10x                                                                                        Ther Ex HEP            Elbow AROM x10  x10 x10         Elbow AAROM x10  3' 3' 3'        Elbow PROM gentle  8' 8' 8' 8' 8'      Overhead flexion x10   Wand chest press/overhead x10 Wand chest press/overhead x10  Wand chest press/overhead x10 2 5 lbs      Supine extension stretch x10            EF and SF gems overhead  15x           Cone stacking             Towel slides wall  10x, up and sideways 10x 15x 15x 1# wrist weight 15x 2# ww 15x 2#      Pulleys  3 mins 3' 3' 3' 3' 3'      Bicep stretch on counter  2x, 60 seconds 60 secs x3 60 sec x3 60 sec x3  On chair 60 sec x3      UBE   6': 3 each 6': 3 each 6' 3 each        Wrist PREs F/E/RD     2 lb x10 2 lb x10 3 lbs x10      Theraband- sh ext, triceps, rows      Red x10 Red x10                   Ther Activity             Wrist maze     5x 5x       Theraband flex bar bends     Yellow 2x10 Yellow 2x10 Red x10                                              Modalities             MHP 5 5' with LLPS 1 5# for EE 5' 1 5# for EE 5' 3# for EE 5' 3# for EE 5' 3# for EE 5' 2# for EE

## 2022-05-18 ENCOUNTER — HOSPITAL ENCOUNTER (EMERGENCY)
Facility: HOSPITAL | Age: 27
Discharge: HOME | End: 2022-05-18
Attending: INTERNAL MEDICINE
Payer: MEDICARE

## 2022-05-18 VITALS
RESPIRATION RATE: 18 BRPM | HEIGHT: 62 IN | HEART RATE: 93 BPM | SYSTOLIC BLOOD PRESSURE: 139 MMHG | OXYGEN SATURATION: 98 % | BODY MASS INDEX: 32.76 KG/M2 | WEIGHT: 178 LBS | TEMPERATURE: 98.5 F | DIASTOLIC BLOOD PRESSURE: 78 MMHG

## 2022-05-18 DIAGNOSIS — J02.9 SORE THROAT: ICD-10-CM

## 2022-05-18 DIAGNOSIS — J06.9 URI (UPPER RESPIRATORY INFECTION): Primary | ICD-10-CM

## 2022-05-18 LAB
FLUAV RNA RESP QL NAA+PROBE: NEGATIVE
FLUBV RNA RESP QL NAA+PROBE: NEGATIVE
RSV RNA RESP QL NAA+PROBE: NEGATIVE
S PYO DNA THROAT QL NAA+PROBE: NOT DETECTED
SARS-COV-2 RNA RESP QL NAA+PROBE: NEGATIVE

## 2022-05-18 PROCEDURE — 99283 EMERGENCY DEPT VISIT LOW MDM: CPT

## 2022-05-18 PROCEDURE — 99282 EMERGENCY DEPT VISIT SF MDM: CPT | Performed by: INTERNAL MEDICINE

## 2022-05-18 PROCEDURE — 87651 STREP A DNA AMP PROBE: CPT | Performed by: INTERNAL MEDICINE

## 2022-05-18 PROCEDURE — 0241U HB NFCT DS VIR RESP RNA 4 TRGT: CPT | Performed by: INTERNAL MEDICINE

## 2022-05-18 NOTE — DISCHARGE INSTRUCTIONS
Follow up with [facility]  Take motrin for pain if needed  Check result of COVID , strep PCR on My Chart

## 2022-05-18 NOTE — ED PROVIDER NOTES
History  Chief Complaint   Patient presents with    Sore Throat     States swollen tonsils and sore throat since yesterday     This is a 28years old came for having sore throat  Patient stated that this is going on since yesterday  Patient failed her tonsils are swollen  Patient has history of strep infection last year and she thing that this could be strep him  Patient just came to check herself  Patient has no fever, cough, vomiting  Patient denies any chest pain SOB  Patient has no abdominal pain  Patient stated that she may need her tonsils to be removed  Patient in no distress  Patient denies any medical history and she stated that she took the COVID vaccine before but she thing that she caught another COVID now  Prior to Admission Medications   Prescriptions Last Dose Informant Patient Reported? Taking?   naproxen (NAPROSYN) 500 mg tablet   No No   Sig: Take 1 tablet (500 mg total) by mouth 2 (two) times a day as needed for mild pain   Patient not taking: Reported on 2/28/2022       Facility-Administered Medications: None       Past Medical History:   Diagnosis Date    Scoliosis        History reviewed  No pertinent surgical history  Family History   Problem Relation Age of Onset    No Known Problems Mother     No Known Problems Father     Breast cancer Maternal Grandmother      I have reviewed and agree with the history as documented  E-Cigarette/Vaping    E-Cigarette Use Never User      E-Cigarette/Vaping Substances     Social History     Tobacco Use    Smoking status: Never Smoker    Smokeless tobacco: Never Used   Vaping Use    Vaping Use: Never used   Substance Use Topics    Alcohol use: Yes     Comment: occas    Drug use: No       Review of Systems   Constitutional: Negative for fatigue and fever  HENT: Positive for sore throat  Negative for congestion, dental problem, ear discharge, ear pain, nosebleeds, sinus pressure, sinus pain, tinnitus and trouble swallowing  Eyes: Negative for visual disturbance  Respiratory: Negative for cough and shortness of breath  Cardiovascular: Negative for chest pain and palpitations  Gastrointestinal: Negative for abdominal pain, diarrhea, nausea and vomiting  Endocrine: Negative for polydipsia, polyphagia and polyuria  Genitourinary: Negative for difficulty urinating, dysuria, flank pain, frequency, hematuria and pelvic pain  Musculoskeletal: Negative for back pain, myalgias, neck pain and neck stiffness  Skin: Negative for color change, pallor and rash  Neurological: Negative for dizziness, light-headedness and headaches  Hematological: Negative for adenopathy  Does not bruise/bleed easily  Psychiatric/Behavioral: Negative for agitation and behavioral problems  Physical Exam  Physical Exam  Vitals and nursing note reviewed  Constitutional:       General: She is not in acute distress  Appearance: She is well-developed  She is not ill-appearing, toxic-appearing or diaphoretic  HENT:      Head: Normocephalic and atraumatic  Right Ear: Tympanic membrane and ear canal normal  No drainage, swelling or tenderness  No middle ear effusion  Tympanic membrane is not erythematous  Left Ear: Tympanic membrane and ear canal normal  No drainage, swelling or tenderness  No middle ear effusion  Tympanic membrane is not erythematous  Nose: No congestion or rhinorrhea  Mouth/Throat:      Mouth: Mucous membranes are moist  No oral lesions  Pharynx: Oropharynx is clear  Uvula midline  No pharyngeal swelling, oropharyngeal exudate, posterior oropharyngeal erythema or uvula swelling  Tonsils: No tonsillar exudate or tonsillar abscesses  0 on the right  0 on the left  Eyes:      Extraocular Movements:      Right eye: Normal extraocular motion  Left eye: Normal extraocular motion  Conjunctiva/sclera: Conjunctivae normal    Neck:      Thyroid: No thyromegaly     Cardiovascular:      Rate and Rhythm: Normal rate and regular rhythm  Heart sounds: Normal heart sounds  No murmur heard  No friction rub  No gallop  Pulmonary:      Effort: Pulmonary effort is normal  No respiratory distress  Breath sounds: Normal breath sounds  No stridor  No wheezing, rhonchi or rales  Chest:      Chest wall: No tenderness  Abdominal:      General: Bowel sounds are normal  There is no distension  Palpations: Abdomen is soft  There is no mass  Tenderness: There is no abdominal tenderness  There is no guarding or rebound  Hernia: No hernia is present  Musculoskeletal:         General: No tenderness or deformity  Normal range of motion  Cervical back: Normal range of motion and neck supple  Lymphadenopathy:      Cervical: No cervical adenopathy  Skin:     General: Skin is warm and dry  Capillary Refill: Capillary refill takes less than 2 seconds  Coloration: Skin is not pale  Findings: No erythema or rash  Neurological:      Mental Status: She is alert and oriented to person, place, and time     Psychiatric:         Behavior: Behavior normal          Vital Signs  ED Triage Vitals [05/18/22 1118]   Temperature Pulse Respirations Blood Pressure SpO2   98 5 °F (36 9 °C) 93 18 139/78 98 %      Temp Source Heart Rate Source Patient Position - Orthostatic VS BP Location FiO2 (%)   Oral -- -- -- --      Pain Score       9           Vitals:    05/18/22 1118   BP: 139/78   Pulse: 93         Visual Acuity      ED Medications  Medications - No data to display    Diagnostic Studies  Results Reviewed     Procedure Component Value Units Date/Time    COVID/FLU/RSV [[id number]]  (Normal) Collected: 05/18/22 1146    Lab Status: Final result Specimen: Nares from Nasopharyngeal Swab Updated: 05/18/22 1317     SARS-CoV-2 Negative     INFLUENZA A PCR Negative     INFLUENZA B PCR Negative     RSV PCR Negative    Narrative:      FOR PEDIATRIC PATIENTS - copy/paste COVID Guidelines URL to browser: [email or web address]    SARS-CoV-2 assay is a Nucleic Acid Amplification assay intended for the  qualitative detection of nucleic acid from SARS-CoV-2 in nasopharyngeal  swabs  Results are for the presumptive identification of SARS-CoV-2 RNA  Positive results are indicative of infection with SARS-CoV-2, the virus  causing COVID-19, but do not rule out bacterial infection or co-infection  with other viruses  Laboratories within the [place] and its  territories are required to report all positive results to the appropriate  public health authorities  Negative results do not preclude SARS-CoV-2  infection and should not be used as the sole basis for treatment or other  patient management decisions  Negative results must be combined with  clinical observations, patient history, and epidemiological information  This test has not been FDA cleared or approved  This test has been authorized by FDA under an Emergency Use Authorization  (EUA)  This test is only authorized for the duration of time the  declaration that circumstances exist justifying the authorization of the  emergency use of an in vitro diagnostic tests for detection of SARS-CoV-2  virus and/or diagnosis of COVID-19 infection under section 564(b)(1) of  the Act, 21 U  S C  401RAK-9(H)(2), unless the authorization is terminated  or revoked sooner  The test has been validated but independent review by FDA  and CLIA is pending  Test performed using [employer] GeneXpert: This RT-PCR assay targets N2,  a region unique to SARS-CoV-2  A conserved region in the E-gene was chosen  for pan-Sarbecovirus detection which includes SARS-CoV-2      Strep A PCR [[id number]]  (Normal) Collected: 05/18/22 1146    Lab Status: Final result Specimen: Throat Updated: 05/18/22 1306     STREP A PCR Not Detected                 No orders to display              Procedures  Procedures         ED Course SBIRT 22yo+    Flowsheet Row Most Recent Value   SBIRT (25 yo +)    In order to provide better care to our patients, we are screening all of our patients for alcohol and drug use  Would it be okay to ask you these screening questions? No Filed at: 05/18/2022 1126                    [facility]  Number of Diagnoses or Management Options  Diagnosis management comments: This is a 28years old came for sore throat  Patient she thing that she has strep as she has is before  Patient has no fever  Patient denies any SOB, CP  Patient found a right she has spot in her oropharynx so she got scared and she came to be checked  Patient she took the [place] vaccination  Physical exam is negative  Patient want to go home and she is going to check the result of the COVID and the rest drip PCR at home  Amount and/or Complexity of Data Reviewed  Clinical lab tests: ordered and reviewed    Risk of Complications, Morbidity, and/or Mortality  Presenting problems: low  Diagnostic procedures: low        Disposition  Final diagnoses:   URI (upper respiratory infection)   Sore throat     Time reflects when diagnosis was documented in both MDM as applicable and the Disposition within this note     Time User Action Codes Description Comment    5/18/2022 12:14 PM Gilbert Arrieta Add [J06 9] URI (upper respiratory infection)     5/18/2022 12:15 PM Gilbert Arrieta Add [J02 9] Sore throat       ED Disposition     ED Disposition   Discharge    Condition   Stable    Date/Time   Wed May 18, 2022 12:14 PM    [facility] discharge to home/self care                 Follow-up Information     Follow up With Specialties Details Why Contact Info Additional Information    [facility] [address] In 1 week  [address] [id number]  [facility], [address], [place], [place], [address], [address] [facility]  Go to  As needed [address] [id number]           Discharge Medication List as of 5/18/2022 12:19 PM      CONTINUE these medications which have NOT CHANGED    Details   naproxen (NAPROSYN) 500 mg tablet Take 1 tablet (500 mg total) by mouth 2 (two) times a day as needed for mild pain, Starting Fri 9/17/2021, Normal             No discharge procedures on file      PDMP Review     None          ED Provider  Electronically Signed by           Pia Givens MD  05/18/22 2704

## 2022-05-19 ENCOUNTER — APPOINTMENT (OUTPATIENT)
Dept: OCCUPATIONAL THERAPY | Facility: CLINIC | Age: 27
End: 2022-05-19
Payer: MEDICARE

## 2022-06-02 NOTE — PROGRESS NOTES
Patient did not return to therapy after this visit  Unable to update measures or assess progress towards the established therapeutic goals, therefore this note shall serve as a testament to the patient's condition at time of discharge

## 2022-10-21 ENCOUNTER — OFFICE VISIT (OUTPATIENT)
Dept: OBGYN CLINIC | Facility: CLINIC | Age: 27
End: 2022-10-21

## 2022-10-21 ENCOUNTER — APPOINTMENT (OUTPATIENT)
Dept: LAB | Facility: CLINIC | Age: 27
End: 2022-10-21
Payer: MEDICARE

## 2022-10-21 VITALS
WEIGHT: 184 LBS | SYSTOLIC BLOOD PRESSURE: 119 MMHG | DIASTOLIC BLOOD PRESSURE: 84 MMHG | HEIGHT: 62 IN | BODY MASS INDEX: 33.86 KG/M2 | HEART RATE: 92 BPM

## 2022-10-21 DIAGNOSIS — Z20.2 TRICHOMONAS EXPOSURE: Primary | ICD-10-CM

## 2022-10-21 DIAGNOSIS — Z20.2 TRICHOMONAS EXPOSURE: ICD-10-CM

## 2022-10-21 DIAGNOSIS — Z11.3 ROUTINE SCREENING FOR STI (SEXUALLY TRANSMITTED INFECTION): ICD-10-CM

## 2022-10-21 LAB
HBV SURFACE AG SER QL: NORMAL
HCV AB SER QL: NORMAL
RPR SER QL: NORMAL

## 2022-10-21 PROCEDURE — 99213 OFFICE O/P EST LOW 20 MIN: CPT | Performed by: OBSTETRICS & GYNECOLOGY

## 2022-10-21 PROCEDURE — 87491 CHLMYD TRACH DNA AMP PROBE: CPT | Performed by: FAMILY MEDICINE

## 2022-10-21 PROCEDURE — 87591 N.GONORRHOEAE DNA AMP PROB: CPT | Performed by: FAMILY MEDICINE

## 2022-10-21 PROCEDURE — 36415 COLL VENOUS BLD VENIPUNCTURE: CPT

## 2022-10-21 PROCEDURE — 86592 SYPHILIS TEST NON-TREP QUAL: CPT

## 2022-10-21 PROCEDURE — 86803 HEPATITIS C AB TEST: CPT

## 2022-10-21 PROCEDURE — 87661 TRICHOMONAS VAGINALIS AMPLIF: CPT | Performed by: FAMILY MEDICINE

## 2022-10-21 PROCEDURE — 87563 M. GENITALIUM AMP PROBE: CPT | Performed by: FAMILY MEDICINE

## 2022-10-21 PROCEDURE — 87389 HIV-1 AG W/HIV-1&-2 AB AG IA: CPT

## 2022-10-21 PROCEDURE — 87340 HEPATITIS B SURFACE AG IA: CPT

## 2022-10-22 LAB
C TRACH DNA SPEC QL NAA+PROBE: NEGATIVE
M GENITALIUM DNA SPEC QL NAA+PROBE: NEGATIVE
N GONORRHOEA DNA SPEC QL NAA+PROBE: NEGATIVE
T VAGINALIS DNA SPEC QL NAA+PROBE: NEGATIVE

## 2022-10-23 LAB — HIV 1+2 AB+HIV1 P24 AG SERPL QL IA: NORMAL

## 2022-10-25 PROBLEM — Z20.2 TRICHOMONAS EXPOSURE: Status: ACTIVE | Noted: 2022-10-25

## 2022-10-26 NOTE — PROGRESS NOTES
Assessment/Plan:    Trichomonas exposure  Patient with exposure to trichomonas through monogamous female partner    Plan:  Trich/Mycoplasma testing  Chlamydia/GC testing  Hep B, Hep C, HIV, RPR  Return to care for annual for Pap/HPV       Diagnoses and all orders for this visit:    Trichomonas exposure  -     Cancel: Trichomonas vaginalis/Mycoplasma genitalium PCR; Future  -     Cancel: Chlamydia/GC amplified DNA by PCR; Future  -     Chlamydia/GC amplified DNA by PCR; Future  -     Trichomonas vaginalis/Mycoplasma genitalium PCR; Future  -     RPR; Future  -     Hepatitis B surface antigen; Future  -     Hepatitis C antibody; Future  -     HIV 1/2 Antigen/Antibody (4th Generation) w Reflex [facility]; Future  -     Chlamydia/GC amplified DNA by PCR  -     Trichomonas vaginalis/Mycoplasma genitalium PCR    Routine screening for STI (sexually transmitted infection)  -     RPR; Future  -     Hepatitis B surface antigen; Future  -     Hepatitis C antibody; Future  -     HIV 1/2 Antigen/Antibody (4th Generation) w Reflex [facility]; Future        Subjective:      Patient ID: Rosealee Goodell is a 28 y o  female  Patient presents today due to monogamous female partner testing positive for trichomonas  Requests testing for STIs at this time  The following portions of the patient's history were reviewed and updated as appropriate: allergies, current medications, past family history, past medical history, past social history, past surgical history and problem list     Review of Systems   Constitutional: Negative for fatigue and fever  HENT: Negative for congestion and sore throat  Respiratory: Negative for cough and shortness of breath  Cardiovascular: Negative for chest pain and palpitations  Gastrointestinal: Negative for abdominal pain, blood in stool, constipation, diarrhea, nausea and vomiting  Genitourinary: Negative for dysuria and hematuria  Musculoskeletal: Negative for arthralgias and myalgias  Skin: Negative for rash  Neurological: Negative for dizziness and headaches  Psychiatric/Behavioral: Negative for dysphoric mood  The patient is not nervous/anxious  Objective:      /84 (BP Location: Left arm, Patient Position: Sitting, Cuff Size: Adult)   Pulse 92   Ht 5' 2" (1 575 m)   Wt 83 5 kg (184 lb)   LMP 10/01/2022   BMI 33 65 kg/m²          Physical Exam  Constitutional:       Appearance: She is well-developed  HENT:      Head: Normocephalic and atraumatic  Right Ear: External ear normal       Left Ear: External ear normal    Eyes:      General: No scleral icterus  Conjunctiva/sclera: Conjunctivae normal    Cardiovascular:      Rate and Rhythm: Normal rate and regular rhythm  Heart sounds: Normal heart sounds  Pulmonary:      Effort: Pulmonary effort is normal  No respiratory distress  Breath sounds: Normal breath sounds  No wheezing  Skin:     General: Skin is warm and dry  Neurological:      Mental Status: She is alert and oriented to person, place, and time

## 2022-11-16 ENCOUNTER — ANNUAL EXAM (OUTPATIENT)
Dept: OBGYN CLINIC | Facility: CLINIC | Age: 27
End: 2022-11-16

## 2022-11-16 VITALS
WEIGHT: 183 LBS | BODY MASS INDEX: 33.68 KG/M2 | HEART RATE: 80 BPM | HEIGHT: 62 IN | SYSTOLIC BLOOD PRESSURE: 121 MMHG | DIASTOLIC BLOOD PRESSURE: 78 MMHG

## 2022-11-16 DIAGNOSIS — Z01.419 ENCOUNTER FOR ANNUAL ROUTINE GYNECOLOGICAL EXAMINATION: Primary | ICD-10-CM

## 2022-11-16 NOTE — PROGRESS NOTES
ASSESSMENT & PLAN: Severo Hutchinson is a 28 y o  Mick Lacer with normal gynecologic exam     1   Routine well woman exam done today  2  Pap and HPV:  The patient's last pap was 08/12/2021  It was normal     Pap was not done today  Current ASCCP Guidelines reviewed  She is due 08/12/2024  3  The following were reviewed in today's visit: breast self exam, STD testing, adequate intake of calcium and vitamin D, exercise and healthy diet  She had negative testing for STD's 10/21/22  4  Gardisil vaccine in women up to age 45-patient is vaccinated  BMI Counseling: Body mass index is 33 47 kg/m²  The BMI is above normal  Nutrition recommendations include 3-5 servings of fruits/vegetables daily, moderation in carbohydrate intake and reducing intake of cholesterol  Exercise recommendations include exercising 3-5 times per week  Depression Screening Follow-up Plan: Patient's depression screening was positive with a PHQ-2 score of 0  Their PHQ-9 score was 0   Clinically patient does not have depression  No treatment is required  CC:  Annual Gynecologic Examination    HPI: Severo Hutchinson is a 28 y o  Mick Lacer who presents for annual gynecologic examination  Her last Pap was 08/12/2021 was negative  She had abnormal Pap 07/28/2020 which was ASCUS positive HPV other, negative HPV 16 and 18  She had a colposcopy done 08/27/2020 which was negative for dysplasia, ECC was negative  History of abnormal Pap 06/10/2019, which was LGSIL,  she had a colposcopy done 07/11/2019 that showed mild squamous atypia, favor LSIL  She tends to hold her urine, and may have intermittent discomfort from that  Recommend to avoid and also decrease foods that are irritating to bladder wall, such as caffeine, teas, soda, citrus, tomatoes  RTO if issues persist         Health Maintenance:    She wears her seatbelt routinely  She does perform regular monthly self breast exams  She feels safe at home       Past Medical History: Diagnosis Date   • Abnormal Pap smear of cervix    • Chlamydia    • Gonorrhea    • Scoliosis        History reviewed  No pertinent surgical history  Past OB/Gyn History:  OB History        1    Para   1    Term   1            AB        Living   1       SAB        IAB        Ectopic        Multiple        Live Births   1               Pt does not have menstrual issues  Monthly x7 days   History of sexually transmitted infection: Yes  GC and CT  History of abnormal pap smears: Yes as per hPI  Patient is currently sexually active  Same sex relationship  The current method of family planning is none  Family History   Problem Relation Age of Onset   • No Known Problems Mother    • No Known Problems Father    • Breast cancer Maternal Grandmother        Social History:  Social History     Socioeconomic History   • Marital status: Single     Spouse name: Not on file   • Number of children: 1   • Years of education: 12   • Highest education level: High school graduate   Occupational History   • Not on file   Tobacco Use   • Smoking status: Never   • Smokeless tobacco: Never   Vaping Use   • Vaping Use: Never used   Substance and Sexual Activity   • Alcohol use: Not Currently     Comment: Only on holidays and special events   • Drug use: No   • Sexual activity: Yes     Partners: Female     Birth control/protection: None   Other Topics Concern   • Not on file   Social History Narrative   • Not on file     Social Determinants of Health     Financial Resource Strain: Medium Risk   • Difficulty of Paying Living Expenses: Somewhat hard   Food Insecurity: Food Insecurity Present   • Worried About Running Out of Food in the Last Year: Sometimes true   • Ran Out of Food in the Last Year: Sometimes true   Transportation Needs: No Transportation Needs   • Lack of Transportation (Medical): No   • Lack of Transportation (Non-Medical):  No   Physical Activity: Not on file   Stress: No Stress Concern Present   • Feeling of Stress : Not at all   Social Connections: Not on file   Intimate Partner Violence: Not At Risk   • Fear of Current or Ex-Partner: No   • Emotionally Abused: No   • Physically Abused: No   • Sexually Abused: No   Housing Stability: Low Risk    • Unable to Pay for Housing in the Last Year: No   • Number of Places Lived in the Last Year: 1   • Unstable Housing in the Last Year: No     Presently lives with family  Patient is single  Patient is currently employed     No Known Allergies      Current Outpatient Medications:   •  naproxen (NAPROSYN) 500 mg tablet, Take 1 tablet (500 mg total) by mouth 2 (two) times a day as needed for mild pain (Patient not taking: Reported on 2/28/2022 ), Disp: 10 tablet, Rfl: 0      Review of Systems  Constitutional :no fever, feels well, no tiredness, no recent weight gain or loss  ENT: no ear ache, no loss of hearing, no nosebleeds or nasal discharge, no sore throat or hoarseness  Cardiovascular: no complaints of slow or fast heart beat, no chest pain, no palpitations, no leg claudication or lower extremity edema  Respiratory: no complaints of shortness of shortness of breath, no TORRES  Breasts:no complaints of breast pain, breast lump, or nipple discharge  Gastrointestinal: no complaints of abdominal pain, constipation, nausea, vomiting, or diarrhea or bloody stools  Genitourinary : no complaints of dysuria, incontinence, pelvic pain, no dysmenorrhea, vaginal discharge or abnormal vaginal bleeding and as noted in HPI  Musculoskeletal: no complaints of arthralgia, no myalgia, no joint swelling or stiffness, no limb pain or swelling    Integumentary: no complaints of skin rash or lesion, itching or dry skin  Neurological: no complaints of headache, no confusion, no numbness or tingling, no dizziness or fainting    Objective      /78 (BP Location: Left arm, Patient Position: Sitting, Cuff Size: Adult)   Pulse 80   Ht 5' 2" (1 575 m)   Wt 83 kg (183 lb)   LMP 10/21/2022   BMI 33 47 kg/m²   General:   appears stated age, cooperative, alert normal mood and affect   Neck: normal, supple,trachea midline, no masses   Heart: regular rate and rhythm, S1, S2 normal, no murmur, click, rub or gallop   Lungs: clear to auscultation bilaterally   Breasts: normal appearance, no masses or tenderness, Inspection negative, No nipple retraction or dimpling, No nipple discharge or bleeding, No axillary or supraclavicular adenopathy, Normal to palpation without dominant masses, Taught monthly breast self examination   Abdomen: soft, non-tender, without masses or organomegaly   Vulva: normal female genitalia, Bartholin's, Urethra, [place] normal   Vagina: normal vagina, no discharge, exudate, lesion, or erythema   Urethra: normal   Cervix: Normal, no discharge  Nontender  Uterus: normal size, contour, position, consistency, mobility, non-tender and anteverted   Adnexa: normal adnexa and no mass, fullness, tenderness   Lymphatic palpation of lymph nodes in neck, axilla, groin and/or other locations: no lymphadenopathy or masses noted   Skin normal skin turgor and no rashes     Psychiatric orientation to person, place, and time: normal  mood and affect: normal

## 2024-02-21 PROBLEM — Z01.419 ENCOUNTER FOR ANNUAL ROUTINE GYNECOLOGICAL EXAMINATION: Status: RESOLVED | Noted: 2019-06-10 | Resolved: 2024-02-21

## 2024-02-21 PROBLEM — Z11.3 ROUTINE SCREENING FOR STI (SEXUALLY TRANSMITTED INFECTION): Status: RESOLVED | Noted: 2019-06-10 | Resolved: 2024-02-21

## 2024-11-26 ENCOUNTER — HOSPITAL ENCOUNTER (EMERGENCY)
Facility: HOSPITAL | Age: 29
Discharge: HOME | End: 2024-11-26
Attending: INTERNAL MEDICINE
Payer: MEDICARE

## 2024-11-26 ENCOUNTER — APPOINTMENT (EMERGENCY)
Dept: RADIOLOGY | Facility: HOSPITAL | Age: 29
End: 2024-11-26
Payer: MEDICARE

## 2024-11-26 VITALS
RESPIRATION RATE: 16 BRPM | DIASTOLIC BLOOD PRESSURE: 70 MMHG | TEMPERATURE: 98.1 F | HEART RATE: 96 BPM | OXYGEN SATURATION: 96 % | SYSTOLIC BLOOD PRESSURE: 141 MMHG

## 2024-11-26 DIAGNOSIS — J06.9 VIRAL URI WITH COUGH: Primary | ICD-10-CM

## 2024-11-26 LAB
FLUAV AG UPPER RESP QL IA.RAPID: NEGATIVE
FLUBV AG UPPER RESP QL IA.RAPID: NEGATIVE
S PYO DNA THROAT QL NAA+PROBE: NOT DETECTED
SARS-COV+SARS-COV-2 AG RESP QL IA.RAPID: NEGATIVE

## 2024-11-26 PROCEDURE — 87651 STREP A DNA AMP PROBE: CPT | Performed by: INTERNAL MEDICINE

## 2024-11-26 PROCEDURE — 87804 INFLUENZA ASSAY W/OPTIC: CPT | Performed by: INTERNAL MEDICINE

## 2024-11-26 PROCEDURE — 87811 SARS-COV-2 COVID19 W/OPTIC: CPT | Performed by: INTERNAL MEDICINE

## 2024-11-26 PROCEDURE — 99284 EMERGENCY DEPT VISIT MOD MDM: CPT | Performed by: INTERNAL MEDICINE

## 2024-11-26 PROCEDURE — 99284 EMERGENCY DEPT VISIT MOD MDM: CPT

## 2024-11-26 PROCEDURE — 71045 X-RAY EXAM CHEST 1 VIEW: CPT

## 2024-11-27 NOTE — DISCHARGE INSTRUCTIONS
Follow-up with [facility].  Drink plenty of fluids.  Take Tylenol Motrin for pain as needed.  Labs Reviewed   COVID-19/INFLUENZA A/B RAPID ANTIGEN (30 MIN.TAT) - Normal       Result Value Ref Range Status    SARS COV Rapid Antigen Negative  Negative Final    Influenza A Rapid Antigen Negative  Negative Final    Influenza B Rapid Antigen Negative  Negative Final    Narrative:     This test has been performed using the [employer] Trudi 2 FLU+SARS Antigen test under the Emergency Use Authorization (EUA). This test has been validated by the  and verified by the performing laboratory. The Trudi uses lateral flow immunofluorescent sandwich assay to detect SARS-COV, Influenza A and Influenza B Antigen.     The [employer] Trudi 2 SARS Antigen test does not differentiate between SARS-CoV and SARS-CoV-2.     Negative results are presumptive and may be confirmed with a molecular assay, if necessary, for patient management. Negative results do not rule out SARS-CoV-2 or influenza infection and should not be used as the sole basis for treatment or patient management decisions. A negative test result may occur if the level of antigen in a sample is below the limit of detection of this test.     Positive results are indicative of the presence of viral antigens, but do not rule out bacterial infection or co-infection with other viruses.     All test results should be used as an adjunct to clinical observations and other information available to the provider.    FOR PEDIATRIC PATIENTS - copy/paste COVID Guidelines URL to browser: [email or web address]   STREP A PCR - Normal    STREP A PCR Not Detected  Not Detected Final     XR chest 1 view portable   ED Interpretation   CXR; no cardiopulmonary findings.

## 2024-11-27 NOTE — ED PROVIDER NOTES
Time reflects when diagnosis was documented in both MDM as applicable and the Disposition within this note       Time User Action Codes Description Comment    11/26/2024  7:20 PM Gilbert Arrieta Add [J06.9] Viral URI with cough           ED Disposition       ED Disposition   Discharge    Condition   Stable    Date/Time   Tue Nov 26, 2024  7:20 PM    Comment   Mireya Lewis discharge to home/self care.                   Assessment & Plan       Medical Decision Making  This is a 29 years old came with multiple symptoms including a cough, body ache, sore throat.  Patient did not take COVID or flu vaccine this year.  Patient has no fever.  Patient has pulse ox 96% on room air.  Patient currently takes no medications.  Physical exam shows no pertinent positive findings.  Examination of the throat there is no congestion, erythema, exudate.  Chest is clear to auscultation no rales no wheezes.  Patient tested for COVID, flu came back undetected.  Strep a PCR undetected.  CXR with no acute cardiopulmonary findings.  Patient has possible viral infection.  Or URI.  Differential diagnose include but not limited to; URI, COVID, influenza, bronchitis.    Amount and/or Complexity of Data Reviewed  Labs: ordered.     Details: COVID, flu came back undetected.  Strep a PCR undetected.    Radiology: ordered and independent interpretation performed.     Details: CXR with no acute cardiopulmonary findings.               Medications - No data to display    ED Risk Strat Scores                                               History of Present Illness       Chief Complaint   Patient presents with    Cough     Pt reports cough x 1 week, HA, runny nose       Past Medical History:   Diagnosis Date    Abnormal Pap smear of cervix     Chlamydia     Gonorrhea     Scoliosis       History reviewed. No pertinent surgical history.   Family History   Problem Relation Age of Onset    No Known Problems Mother     No Known Problems Father     No  Known Problems Sister     No Known Problems Sister     No Known Problems Sister     No Known Problems Brother     No Known Problems Brother     No Known Problems Son     Breast cancer Maternal Grandmother     HIV Maternal Grandfather     Colon cancer Neg Hx     Ovarian cancer Neg Hx       Social History     Tobacco Use    Smoking status: Never    Smokeless tobacco: Never   Vaping Use    Vaping status: Never Used   Substance Use Topics    Alcohol use: Not Currently     Comment: Only on holidays and special events    Drug use: Yes     Types: Marijuana      E-Cigarette/Vaping    E-Cigarette Use Never User       E-Cigarette/Vaping Substances    Nicotine No     THC No     CBD No     Flavoring No     Other No     Unknown No       I have reviewed and agree with the history as documented.     This is 29 years old came with multiple symptoms.  Patient stated that she has sore throat, body ache, cough, and does not feel well.  Patient denies fever.  Patient denies taking COVID or flu vaccine.  At the ER patient has no fever and has pulse ox 96% on room air.  Patient stated that she is smoke weed but not cigarettes.  Patient sitting in no distress.  Patient currently takes no medications.  Patient denies CP, SOB, abdominal pain, nausea vomiting diarrhea.      History provided by:  Patient   used: No    Cough  Cough characteristics:  Dry  Severity:  Mild  Onset quality:  Unable to specify  Progression:  Unchanged  Associated symptoms: sore throat    Associated symptoms: no chest pain, no chills, no ear pain, no fever, no rash, no rhinorrhea and no shortness of breath        Review of Systems   Constitutional:  Negative for chills and fever.   HENT:  Positive for sore throat. Negative for congestion, ear pain, rhinorrhea, sinus pressure, sinus pain and sneezing.    Eyes:  Negative for pain and visual disturbance.   Respiratory:  Positive for cough. Negative for shortness of breath.    Cardiovascular:   Negative for chest pain and palpitations.   Gastrointestinal:  Negative for abdominal pain and vomiting.   Genitourinary:  Negative for dysuria and hematuria.   Musculoskeletal:  Negative for arthralgias and back pain.   Skin:  Negative for color change and rash.   Neurological:  Negative for seizures and syncope.   All other systems reviewed and are negative.          Objective       ED Triage Vitals   Temperature Pulse Blood Pressure Respirations SpO2 Patient Position - Orthostatic VS   11/26/24 1717 11/26/24 1716 11/26/24 1716 11/26/24 1716 11/26/24 1716 --   98.1 °F (36.7 °C) 96 141/70 16 96 %       Temp Source Heart Rate Source BP Location FiO2 (%) Pain Score    11/26/24 1717 -- -- -- 11/26/24 1720    Oral    5      Vitals      Date and Time Temp Pulse SpO2 Resp BP Pain Score FACES Pain Rating User   11/26/24 1720 -- -- -- -- -- 5 -- BG   11/26/24 1717 98.1 °F (36.7 °C) -- -- -- -- -- -- BG   11/26/24 1716 -- 96 96 % 16 141/70 -- -- BG            Physical Exam  Vitals and nursing note reviewed.   Constitutional:       General: She is not in acute distress.     Appearance: She is well-developed. She is not ill-appearing, toxic-appearing or diaphoretic.   HENT:      Head: Normocephalic and atraumatic.      Right Ear: Ear canal normal.      Left Ear: Ear canal normal.      Nose: Nose normal. No congestion or rhinorrhea.      Mouth/Throat:      Mouth: Mucous membranes are moist.      Pharynx: No oropharyngeal exudate or posterior oropharyngeal erythema.   Eyes:      Conjunctiva/sclera: Conjunctivae normal.   Neck:      Vascular: No carotid bruit.   Cardiovascular:      Rate and Rhythm: Normal rate and regular rhythm.      Heart sounds: No murmur heard.     No friction rub. No gallop.   Pulmonary:      Effort: Pulmonary effort is normal. No respiratory distress.      Breath sounds: Normal breath sounds. No stridor. No wheezing, rhonchi or rales.   Chest:      Chest wall: No tenderness.   Abdominal:      General:  Abdomen is flat. There is no distension.      Palpations: Abdomen is soft. There is no mass.      Tenderness: There is no abdominal tenderness. There is no right CVA tenderness, left CVA tenderness, guarding or rebound.      Hernia: No hernia is present.   Musculoskeletal:         General: No swelling, tenderness, deformity or signs of injury.      Cervical back: Neck supple. No rigidity or tenderness.      Right lower leg: No edema.      Left lower leg: No edema.   Lymphadenopathy:      Cervical: No cervical adenopathy.   Skin:     General: Skin is warm and dry.      Capillary Refill: Capillary refill takes less than 2 seconds.      Coloration: Skin is not jaundiced or pale.      Findings: No bruising, erythema, lesion or rash.   Neurological:      Mental Status: She is alert and oriented to person, place, and time.   Psychiatric:         Mood and Affect: Mood normal.         Results Reviewed       Procedure Component Value Units Date/Time    Strep A PCR [[id number]]  (Normal) Collected: 11/26/24 1817    Lab Status: Final result Specimen: Throat Updated: 11/26/24 1852     STREP A PCR Not Detected    FLU/COVID Rapid Antigen (30 min. TAT) - Preferred screening test in ED [[id number]]  (Normal) Collected: 11/26/24 1817    Lab Status: Final result Specimen: Nares from Nose Updated: 11/26/24 1844     SARS COV Rapid Antigen Negative     Influenza A Rapid Antigen Negative     Influenza B Rapid Antigen Negative    Narrative:      This test has been performed using the Quidel Trudi 2 FLU+SARS Antigen test under the Emergency Use Authorization (EUA). This test has been validated by the  and verified by the performing laboratory. The Trudi uses lateral flow immunofluorescent sandwich assay to detect SARS-COV, Influenza A and Influenza B Antigen.     The Quidel Trudi 2 SARS Antigen test does not differentiate between SARS-CoV and SARS-CoV-2.     Negative results are presumptive and may be confirmed with a molecular  assay, if necessary, for patient management. Negative results do not rule out SARS-CoV-2 or influenza infection and should not be used as the sole basis for treatment or patient management decisions. A negative test result may occur if the level of antigen in a sample is below the limit of detection of this test.     Positive results are indicative of the presence of viral antigens, but do not rule out bacterial infection or co-infection with other viruses.     All test results should be used as an adjunct to clinical observations and other information available to the provider.    FOR PEDIATRIC PATIENTS - copy/paste COVID Guidelines URL to browser: [email or web address]            XR chest 1 view portable   ED Interpretation by Gilbert Arrieta MD (11/26 1913)   CXR; no cardiopulmonary findings.      Final Interpretation by Aracelis James MD (11/26 1931)      No acute pulmonary pathology.      Findings are concordant with preliminary interpretation provided in the Emergency Department.            Workstation performed: [id number]             Procedures    ED Medication and Procedure Management   Prior to Admission Medications   Prescriptions Last Dose Informant Patient Reported? Taking?   naproxen (NAPROSYN) 500 mg tablet   No No   Sig: Take 1 tablet (500 mg total) by mouth 2 (two) times a day as needed for mild pain   Patient not taking: Reported on 2/28/2022       Facility-Administered Medications: None     Discharge Medication List as of 11/26/2024  7:21 PM        CONTINUE these medications which have NOT CHANGED    Details   naproxen (NAPROSYN) 500 mg tablet Take 1 tablet (500 mg total) by mouth 2 (two) times a day as needed for mild pain, Starting Fri 9/17/2021, Normal           No discharge procedures on file.  ED SEPSIS DOCUMENTATION   Time reflects when diagnosis was documented in both MDM as applicable and the Disposition within this note       Time  User Action Codes Description Comment    11/26/2024  7:20 PM Gilbert Arrieta Add [J06.9] Viral URI with cough                  Gilbert Arrieta MD  11/27/24 9840